# Patient Record
Sex: MALE | Race: WHITE | Employment: OTHER | URBAN - METROPOLITAN AREA
[De-identification: names, ages, dates, MRNs, and addresses within clinical notes are randomized per-mention and may not be internally consistent; named-entity substitution may affect disease eponyms.]

---

## 2017-01-16 ENCOUNTER — HOSPITAL ENCOUNTER (OUTPATIENT)
Dept: NON INVASIVE DIAGNOSTICS | Age: 72
Discharge: HOME | End: 2017-01-16
Attending: INTERNAL MEDICINE
Payer: MEDICARE

## 2017-01-16 DIAGNOSIS — I25.10 ATHEROSCLEROSIS OF NATIVE CORONARY ARTERY OF NATIVE HEART WITHOUT ANGINA PECTORIS: ICD-10-CM

## 2017-01-16 LAB
ATTENDING PHYSICIAN, CST07: NORMAL
DIAGNOSIS, 93000: NORMAL
DUKE TM SCORE RESULT, CST14: NORMAL
DUKE TREADMILL SCORE, CST13: NORMAL
ECG INTERP BEFORE EX, CST11: NORMAL
ECG INTERP DURING EX, CST12: NORMAL
FUNCTIONAL CAPACITY, CST17: NORMAL
KNOWN CARDIAC CONDITION, CST08: NORMAL
MAX. DIASTOLIC BP, CST04: 60 MMHG
MAX. HEART RATE, CST05: 99 BPM
MAX. SYSTOLIC BP, CST03: 130 MMHG
OVERALL BP RESPONSE TO EXERCISE, CST16: NORMAL
OVERALL HR RESPONSE TO EXERCISE, CST15: NORMAL
PEAK EX METS, CST10: 1.5 METS
PROTOCOL NAME, CST01: NORMAL
TEST INDICATION, CST09: NORMAL

## 2017-01-16 PROCEDURE — 93017 CV STRESS TEST TRACING ONLY: CPT | Performed by: INTERNAL MEDICINE

## 2017-01-16 PROCEDURE — A9500 TC99M SESTAMIBI: HCPCS

## 2017-01-16 PROCEDURE — 74011000258 HC RX REV CODE- 258: Performed by: INTERNAL MEDICINE

## 2017-01-16 PROCEDURE — 78452 HT MUSCLE IMAGE SPECT MULT: CPT | Performed by: INTERNAL MEDICINE

## 2017-01-16 PROCEDURE — 74011250636 HC RX REV CODE- 250/636: Performed by: INTERNAL MEDICINE

## 2017-01-16 RX ORDER — SODIUM CHLORIDE 9 MG/ML
100 INJECTION, SOLUTION INTRAVENOUS ONCE
Status: COMPLETED | OUTPATIENT
Start: 2017-01-16 | End: 2017-01-16

## 2017-01-16 RX ADMIN — SODIUM CHLORIDE 100 ML/HR: 900 INJECTION, SOLUTION INTRAVENOUS at 10:45

## 2017-01-16 RX ADMIN — REGADENOSON 0.4 MG: 0.08 INJECTION, SOLUTION INTRAVENOUS at 10:45

## 2017-01-16 NOTE — PROGRESS NOTES
Patient was given 11.0 milliCuries of 99mTc-Sestamibi for the resting images. Patient was also given 33.0 milliCuries of 99mTc-Sestamibi for the stress images. Injected 0.4mg Lexiscan while slowly walking on treadmill. Patient's armband was discarded and shredded.

## 2017-01-17 NOTE — PROCEDURES
[address] STRESS    Name:  Edilma He  MR#:  [id number]  :  1945  Account #:  [de-identified]  Date of Adm:  2017  Date of Service:  2107      PROCEDURE: Lexiscan Cardiolite myocardial perfusion study. Patient of Dr. Prerna Guzman and Dr. Tamara Davies. REASON FOR STUDY: Chronic coronary artery disease. Electrocardiogram resting demonstrates normal sinus rhythm, rate 73  with some sinus arrhythmia; there is an incomplete right bundle branch  block; and some poor R-wave progression anterolaterally with left  ventricular hypertrophy by only voltage criteria. PROCEDURE: The patient received 11 mCi of technetium-99m  sestamibi intravenously via the left arm IV, had a resting myocardial  perfusion study completed. He subsequently was given Lexiscan 0.4  mg intravenously and asked to walk at 0.8 miles per hour at 0 grade for  3 minutes. The patient had no complaints with Lexiscan administration  or significant electrocardiographic changes. He subsequently was  given 33 mCi of technetium-99m sestamibi intravenously via the left  arm IV and had a stress myocardial perfusion study completed and  compared to the resting study. FINDINGS: There appeared to be mild fixed perfusion defect in basal  inferior wall with adequate perfusion throughout the remainder of the  myocardium without clear-cut signs of ischemia or infarction. Gated  SPECT imaging demonstrated normal left ventricular volumes, wall  motion and function with an ejection fraction of 65%. There was a  transient ischemic dilatation index of 1.05, but this is still within normal  limits for a pharmacologic myocardial perfusion study. IMPRESSION  1. Normal Lexiscan Cardiolite myocardial perfusion study.   2. Small area of fixed perfusion defect in the basal inferior wall from   diaphragmatic attenuation presumably with otherwise adequate   perfusion throughout the left ventricular myocardium without signs of   ischemia or infarction. 3. Normal left ventricular volumes, wall motion and function with an  ejection fraction of 65%. 4. Negative pharmacologic stress test electrocardiographically. 5. In comparison to the report of the study of 01/28/2015, there did not  appear to be any significant interval change with that study also  considered to be within normal limits without signs of ischemia and  ejection fraction of 70%. 6. This was a low risk Lexiscan Cardiolite myocardial perfusion study.         DO VICKY Cazares MP  D:  01/16/2017   18:02  T:  01/17/2017   02:22  Job #:  [id number]

## 2017-01-19 ENCOUNTER — OFFICE VISIT (OUTPATIENT)
Dept: CARDIOLOGY CLINIC | Age: 72
End: 2017-01-19

## 2017-01-19 VITALS
DIASTOLIC BLOOD PRESSURE: 62 MMHG | HEIGHT: 66 IN | OXYGEN SATURATION: 96 % | SYSTOLIC BLOOD PRESSURE: 120 MMHG | WEIGHT: 161 LBS | BODY MASS INDEX: 25.88 KG/M2 | HEART RATE: 85 BPM

## 2017-01-19 DIAGNOSIS — E78.00 HYPERCHOLESTEROLEMIA: ICD-10-CM

## 2017-01-19 DIAGNOSIS — I49.3 PVC'S (PREMATURE VENTRICULAR CONTRACTIONS): ICD-10-CM

## 2017-01-19 DIAGNOSIS — I25.10 ATHEROSCLEROSIS OF NATIVE CORONARY ARTERY OF NATIVE HEART WITHOUT ANGINA PECTORIS: Primary | ICD-10-CM

## 2017-01-19 DIAGNOSIS — J43.9 PULMONARY EMPHYSEMA, UNSPECIFIED EMPHYSEMA TYPE (HCC): ICD-10-CM

## 2017-01-19 DIAGNOSIS — R06.09 DOE (DYSPNEA ON EXERTION): ICD-10-CM

## 2017-01-19 NOTE — MR AVS SNAPSHOT
Visit Information Date & Time Provider Department Dept. Phone Encounter #  
 1/19/2017  2:00 PM Jeannette Dubon MD [facility] [address] [id number] Upcoming Health Maintenance Date Due DTaP/Tdap/Td series (1 - Tdap) 1/26/1964 FOBT Q 1 YEAR AGE 50-75 1/26/1993 ZOSTER VACCINE AGE 60> 1/26/2003 GLAUCOMA SCREENING Q2Y 1/26/2008 Pneumococcal 65+ Low/Medium Risk (1 of 2 - PCV13) 1/26/2008 MEDICARE YEARLY EXAM 1/26/2008 INFLUENZA AGE 9 TO ADULT 8/1/2016 Allergies as of 1/19/2017  Review Complete On: 1/19/2017 By: Jeannette Dubon MD  
  
 Severity Noted Reaction Type Reactions Lipitor [Atorvastatin]  03/06/2012    Other (comments) Joint pain Current Immunizations  Never Reviewed No immunizations on file. Not reviewed this visit You Were Diagnosed With   
  
 Codes Comments Atherosclerosis of native coronary artery of native heart without angina pectoris    -  Primary ICD-10-CM: I25.10 ICD-9-CM: 414.01 Vitals BP Pulse Height(growth percentile) Weight(growth percentile) SpO2 BMI  
 120/62 85 5' 6\" (1.676 m) 161 lb (73 kg) 96% 25.99 kg/m2 Smoking Status Never Smoker Vitals History BMI and BSA Data Body Mass Index Body Surface Area  
 25.99 kg/m 2 1.84 m 2 Preferred Pharmacy Pharmacy Name Phone [facility] [phone] [facility] [phone] Your Updated Medication List  
  
   
This list is accurate as of: 1/19/17  3:01 PM.  Always use your most recent med list.  
  
  
  
  
 AMBIEN 10 mg tablet Generic drug:  zolpidem Take 12.5 mg by mouth nightly. clopidogrel 75 mg Tab Commonly known as:  PLAVIX TAKE ONE TABLET BY MOUTH ONCE DAILY  
  
 CO Q-10 10 mg Cap Generic drug:  coenzyme q10 Take 100 mg by mouth daily. levothyroxine 50 mcg tablet Commonly known as:  SYNTHROID  
 Take 50 mcg by mouth daily. LORazepam 1 mg tablet Commonly known as:  ATIVAN Take  by mouth every six (6) hours as needed. metoprolol tartrate 25 mg tablet Commonly known as:  LOPRESSOR  
TAKE ONE TABLET BY MOUTH TWICE DAILY  
  
 nitroglycerin 0.4 mg SL tablet Commonly known as:  NITROSTAT  
1 Tab by SubLINGual route every five (5) minutes as needed for Chest Pain. ondansetron hcl 4 mg tablet Commonly known as:  Sarah Scriver Take 1 tablet by mouth every eight (8) hours as needed for Nausea. PriLOSEC OTC 20 mg tablet Generic drug:  omeprazole Take 20 mg by mouth as needed. VENTOLIN HFA 90 mcg/actuation inhaler Generic drug:  albuterol INHALE TWO PUFFS TWICE DAILY  
  
 VITAMIN B-12 1,000 mcg tablet Generic drug:  cyanocobalamin Take 1,000 mcg by mouth daily. ZOCOR 80 mg tablet Generic drug:  simvastatin Take 80 mg by mouth nightly. ZyrTEC 10 mg tablet Generic drug:  cetirizine Take  by mouth as needed. We Performed the Following AMB POC EKG ROUTINE W/ 12 LEADS, INTER & REP [57357 CPT(R)] Introducing [facility] SERVICES! Carolina Meier introduces [employer] patient portal. Now you can access parts of your medical record, email your doctor's office, and request medication refills online. 1. In your internet browser, go to [email or web address] 2. Click on the First Time User? Click Here link in the Sign In box. You will see the New Member Sign Up page. 3. Enter your [employer] Access Code exactly as it appears below. You will not need to use this code after youve completed the sign-up process. If you do not sign up before the expiration date, you must request a new code. · [employer] Access Code: [email or web address] Expires: 4/11/2017  9:02 AM 
 
4. Enter the last four digits of your Social Security Number (xxxx) and Date of Birth (mm/dd/yyyy) as indicated and click Submit. You will be taken to the next sign-up page. 5. Create a [employer] ID. This will be your [employer] login ID and cannot be changed, so think of one that is secure and easy to remember. 6. Create a [employer] password. You can change your password at any time. 7. Enter your Password Reset Question and Answer. This can be used at a later time if you forget your password. 8. Enter your e-mail address. You will receive e-mail notification when new information is available in [address] 9. Click Sign Up. You can now view and download portions of your medical record. 10. Click the Download Summary menu link to download a portable copy of your medical information. If you have questions, please visit the Frequently Asked Questions section of the [employer] website. Remember, [employer] is NOT to be used for urgent needs. For medical emergencies, dial 911. Now available from your iPhone and Android! Please provide this summary of care documentation to your next provider. Your primary care clinician is listed as [facility]. If you have any questions after today's visit, please call [phone].

## 2017-01-19 NOTE — PROGRESS NOTES
1. Have you been to the ER, urgent care clinic since your last visit? Hospitalized since your last visit? NO  2. Have you seen or consulted any other health care providers outside of the [address] [place] [address] since your last visit? Include any pap smears or colon screening.  NO

## 2017-01-19 NOTE — PROGRESS NOTES
HISTORY OF PRESENT ILLNESS  Zen Vallejo is a 66 y.o. male. HPI  He has been feeling well. Other than chronic dyspnea on exertion, he has no complaints. He continues to play golf regularly with no difficulties. He denies chest pain, resting dyspnea, orthopnea or PND. He has had no palpitations, dizziness or syncope. He has had no symptoms to indicate TIA or amaurosis fugax. He underwent followup stress nuclear cardiac imaging on 01/16/2017, which demonstrated a small area of fixed perfusion defect in the basal inferior wall most likely related to diaphragmatic attenuation with no other perfusion defect. Ejection fraction was in the range of 65%. He has a history of CABG X4 in the latter part of June 1996, which consisted of:    1. Single SVG to the distal LAD. 2. Sequential SVG to the proximal LAD and intermediate marginal branch. 3. Single SVG to the distal RCA. Because of an abnormal thallium myocardial scanning, he underwent a cardiac catheterization on December 4, 2001, which demonstrated total occlusion of the vein graft to the distal LAD but patent sequential SVG to the proximal LAD and intermediate marginal branch. The SVG to the distal RCA was totally occluded; however, the RCA was still open with a 90% stenosis. It was successfully angioplastied and stented. His LV function was mildly diminished with an EF in the range of 50% or less.    Because of the palpitations and skipping, he underwent a 24-hour Holter monitor on February 18, 2004, which demonstrated some frequent atrial ectopies and intermittent short runs of atrial tachycardia and fibrillation and rare PVCs. He was advised to take a beta blocker, but he has been very reluctant to take a beta blocker because of fear of sexual dysfunction. He has had no sustained tachycardia thus far.  Because of recurrent chest pain, he underwent cardiac catheterization on March 7, 2005, which demonstrated:    1. 60% stenosis of the left main trunk.    2. Total occlusion of the LAD in the proximal segment.    3. Severe, diffuse disease of the circumflex artery, with total occlusion.    4. 80% stenosis of the ostium of the ramus intermedius.    5. Patent dominant right coronary artery, with 10% stenosis.    6. Patent SVG to the proximal LAD, but total occlusion of the vein graft to the distal LAD.    7. Total occlusion of the vein graft to the distal RCA.    8. Normal left ventricular systolic function, with EF in the 60% range.    It was felt that he could be best treated medically. He underwent stress nuclear cardiac imaging on May 7, 2007, at which time he was able to exercise 9-minutes, with no chest pain or EKG changes. His nuclear imaging demonstrated mild scarring and ischemia in the inferior wall. The ejection fraction was estimated in the 72% range. He had repeat stress nuclear cardiac imaging on August 20, 2009 at which time he was able to exercise 9 minutes and 35 seconds with no chest pain or EKG changes. Perfusion imaging was essentially unchanged in comparison to the one from May 7, 2007.    He had repeat stress test as a Lexiscan Cardiolite myocardial perfusion imaging on 8/2/11 which was essentially unchanged and negative.    His follow up stress nuclear cardiac imaging on 7/3/2012 demonstrated normal perfusion with no evidence of scaring or ischemia. The ejection fraction was estimated in the 70% range.    Because of the continued dyspnea on exertion, he had repeat cardiac catheterization on 12/6/12 to rule out any atypical presentation of angina. His coronary angiogram demonstrated new 60-85% diffuse narrowing in the native LAD at the insertion of the vein graft to the proximal LAD. The remainder of the coronary anatomy was unchanged and there was no evidence of restenosis of the stented right coronary artery. LVEDP was up to 18 mmHg.  The left ventricular ejection fraction was in the 60% range.    The native LAD was subsequently stented with the 2.25 x 30 mm Resolute stent successfully.    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 His CT scan of the chest demonstrated evidence of COPD with emphysema in the upper lobes and mosaic attenuation which is widespread and likely secondary to small airway disease. There was also some bronchial thickening and mild cylindrical bronchiectasis. He was subsequently referred to a pulmonologist who advised him that he has a chronic bronchitis. He was started on inhalers with very little help.    He underwent stress nuclear cardiac imaging for follow up on 1/28/15, which demonstrated normal perfusion with no evidence of scarring or ischemia. The ejection fraction was in the 70% range. Review of Systems   Constitutional: Negative for malaise/fatigue and weight loss. HENT: Negative for hearing loss. Eyes: Negative for blurred vision and double vision. Respiratory: Positive for shortness of breath. Cardiovascular: Negative for chest pain, palpitations, orthopnea, claudication, leg swelling and PND. Gastrointestinal: Negative for blood in stool, heartburn and melena. Genitourinary: Positive for frequency. Negative for dysuria, hematuria and urgency. Musculoskeletal: Negative for back pain and joint pain. Skin: Negative for itching and rash. Neurological: Positive for dizziness. Negative for loss of consciousness, weakness and headaches. Psychiatric/Behavioral: Negative for depression and memory loss. Physical Exam   Constitutional: He is oriented to person, place, and time. He appears well-developed and well-nourished. HENT:   Head: Normocephalic and atraumatic. Eyes: Conjunctivae are normal. Pupils are equal, round, and reactive to light. Neck: Normal range of motion. Neck supple. No JVD present.    Cardiovascular: Normal rate, regular rhythm, S1 normal and S2 normal.   No extrasystoles are present. PMI is not displaced. Exam reveals no gallop and no friction rub. No murmur heard. Pulses:       Carotid pulses are 3+ on the right side, and 3+ on the left side. Pulmonary/Chest: Effort normal. He has wheezes. He has rales. Abdominal: Soft. There is no tenderness. Musculoskeletal: He exhibits no edema. Neurological: He is alert and oriented to person, place, and time. No cranial nerve deficit. Skin: Skin is warm and dry. Psychiatric: He has a normal mood and affect. His behavior is normal.     Visit Vitals    /62    Pulse 85    Ht 5' 6\" (1.676 m)    Wt 73 kg (161 lb)    SpO2 96%    BMI 25.99 kg/m2       Past Medical History   Diagnosis Date    Arrhythmia     Bronchitis 6/2013         CABG x 4 2001    CAD (coronary artery disease)     Cardiac cath 12/06/2012     RCA prev stent patent. LM severe but patent (supplies pLAD & CX). dLAD 60-85% (2.25 x 30-mm Resolute stent, resid 0%). SVG to % (known). SVG to dLAD 100% (known). Seq SVG to pLAD & RI patent. LVEDP 18.  EF 60%. Poss mild mid anterior hypk.  Cardiac nuclear imaging test, low risk 01/16/2017     Low risk. Sm basal inferior fixed defect, likely artifact. No ischemia. No WMA. EF 65%. Neg EKG on pharm stress test.  Unchanged from study of 1/28/15.     Chest pain      atypical    Chronic obstructive pulmonary disease (HCC)     Coronary artery disease      Status post CABG x 4, with stenting of the native RCA in Dec 2001/ EF 60%    HAYWARD (dyspnea on exertion)     Elevated liver enzymes      mild    Hypercholesterolemia     Hypertension     Palpitations     PVC's (premature ventricular contractions)     Unspecified sleep apnea      wife stated pt's doctor aware  is unable to jazmin use of cpap    Vertigo      mild       Social History     Social History    Marital status:      Spouse name: N/A    Number of children: N/A    Years of education: N/A     Occupational History    Not on file. Social History Main Topics    Smoking status: Never Smoker    Smokeless tobacco: Never Used    Alcohol use Yes      Comment: 10 drinks per week    Drug use: No    Sexual activity: Not on file     Other Topics Concern    Not on file     Social History Narrative       Family History   Problem Relation Age of Onset    Hypertension Brother     Arthritis-osteo Brother     Cancer Mother      bone and breast cancer       Past Surgical History   Procedure Laterality Date    Hx angioplasty  12/2001, 2012     with stenting of native RCA    Pr hand/finger surgery unlisted  11-16-11     right    Hx coronary artery bypass graft  6/1996     x 4; Single SVG to distal LAD; sequential SVG to proximal LAD & intermediate marginal branch; single SVG to distal RCA    Hx cholecystectomy  2010    Hx hernia repair  2007    Hx heent       CATARACT       Current Outpatient Prescriptions   Medication Sig Dispense Refill    clopidogrel (PLAVIX) 75 mg tablet TAKE ONE TABLET BY MOUTH ONCE DAILY 30 Tab 11    metoprolol (LOPRESSOR) 25 mg tablet TAKE ONE TABLET BY MOUTH TWICE DAILY 60 Tab 11    levothyroxine (SYNTHROID) 50 mcg tablet Take 50 mcg by mouth daily.  LORazepam (ATIVAN) 1 mg tablet Take  by mouth every six (6) hours as needed.  ondansetron hcl (ZOFRAN) 4 mg tablet Take 1 tablet by mouth every eight (8) hours as needed for Nausea. 14 tablet 0    VENTOLIN HFA 90 mcg/actuation inhaler INHALE TWO PUFFS TWICE DAILY 17 g 2    simvastatin (ZOCOR) 80 mg tablet Take 80 mg by mouth nightly.  nitroglycerin (NITROSTAT) 0.4 mg SL tablet 1 Tab by SubLINGual route every five (5) minutes as needed for Chest Pain. 25 Tab 3    omeprazole (PRILOSEC OTC) 20 mg tablet Take 20 mg by mouth as needed.  cetirizine (ZYRTEC) 10 mg tablet Take  by mouth as needed.  zolpidem (AMBIEN) 10 mg tablet Take 12.5 mg by mouth nightly.       cyanocobalamin (VITAMIN B-12) 1,000 mcg tablet Take 1,000 mcg by mouth daily.  coenzyme q10 (CO Q-10) 10 mg Cap Take 100 mg by mouth daily. EKG: unchanged from previous tracings, normal sinus rhythm, RBBB, occasional PVC noted, unifocal, left axis deviation, upper limit ME  .  ASSESSMENT and PLAN  Encounter Diagnoses   Name Primary?  Coronary artery disease, status post CABG X4 in June 1996, with stenting of the native RCA in December 2001/ EF 60%; s/p stenting in the LAD 12/6/12 Yes    Chronic PVCs.  HAYWARD (dyspnea on exertion)     Pulmonary emphysema, unspecified emphysema type (HCC)     Hypercholesterolemia    He has been doing reasonably well. He has had no symptoms to indicate angina. He had recent negative stress nuclear cardiac imaging. His dyspnea on exertion seems to be related to his COPD and pulmonary fibrosis. He has remained relatively active playing golf regularly. For now, from a cardiac standpoint, he will be continued on his current medical regimen.

## 2017-01-24 ENCOUNTER — HOSPITAL ENCOUNTER (OUTPATIENT)
Dept: GENERAL RADIOLOGY | Age: 72
Discharge: HOME | End: 2017-01-24
Payer: MEDICARE

## 2017-01-24 DIAGNOSIS — R06.02 SHORTNESS OF BREATH: ICD-10-CM

## 2017-01-24 PROCEDURE — 71020 XR CHEST PA LAT: CPT

## 2017-02-14 RX ORDER — METOPROLOL TARTRATE 25 MG/1
TABLET, FILM COATED ORAL
Qty: 60 TAB | Refills: 6 | Status: SHIPPED | OUTPATIENT
Start: 2017-02-14 | End: 2017-12-05 | Stop reason: SDUPTHER

## 2017-02-23 ENCOUNTER — HOSPITAL ENCOUNTER (OUTPATIENT)
Dept: GENERAL RADIOLOGY | Age: 72
Discharge: HOME | End: 2017-02-23
Payer: MEDICARE

## 2017-02-23 DIAGNOSIS — R06.02 SHORTNESS OF BREATH: ICD-10-CM

## 2017-02-23 PROCEDURE — 71020 XR CHEST PA LAT: CPT

## 2017-07-25 ENCOUNTER — OFFICE VISIT (OUTPATIENT)
Dept: CARDIOLOGY CLINIC | Age: 72
End: 2017-07-25

## 2017-07-25 VITALS
HEIGHT: 66 IN | BODY MASS INDEX: 25.07 KG/M2 | OXYGEN SATURATION: 97 % | DIASTOLIC BLOOD PRESSURE: 70 MMHG | WEIGHT: 156 LBS | HEART RATE: 71 BPM | SYSTOLIC BLOOD PRESSURE: 120 MMHG

## 2017-07-25 DIAGNOSIS — J84.10 PULMONARY FIBROSIS (HCC): ICD-10-CM

## 2017-07-25 DIAGNOSIS — R06.09 DOE (DYSPNEA ON EXERTION): ICD-10-CM

## 2017-07-25 DIAGNOSIS — I25.10 ATHEROSCLEROSIS OF NATIVE CORONARY ARTERY OF NATIVE HEART WITHOUT ANGINA PECTORIS: Primary | ICD-10-CM

## 2017-07-25 DIAGNOSIS — I49.3 PVC'S (PREMATURE VENTRICULAR CONTRACTIONS): ICD-10-CM

## 2017-07-25 NOTE — MR AVS SNAPSHOT
Visit Information Date & Time Provider Department Dept. Phone Encounter #  
 7/25/2017  9:00 AM Kelly Abbott MD [facility] [address] [id number] Upcoming Health Maintenance Date Due DTaP/Tdap/Td series (1 - Tdap) 1/26/1964 FOBT Q 1 YEAR AGE 50-75 1/26/1993 ZOSTER VACCINE AGE 60> 11/26/2002 GLAUCOMA SCREENING Q2Y 1/26/2008 Pneumococcal 65+ Low/Medium Risk (1 of 2 - PCV13) 1/26/2008 MEDICARE YEARLY EXAM 1/26/2008 INFLUENZA AGE 9 TO ADULT 8/1/2017 Allergies as of 7/25/2017  Review Complete On: 7/25/2017 By: Kelly Abbott MD  
  
 Severity Noted Reaction Type Reactions Lipitor [Atorvastatin]  03/06/2012    Other (comments) Joint pain Current Immunizations  Never Reviewed No immunizations on file. Not reviewed this visit You Were Diagnosed With   
  
 Codes Comments Atherosclerosis of native coronary artery of native heart without angina pectoris    -  Primary ICD-10-CM: I25.10 ICD-9-CM: 414.01   
 PVC's (premature ventricular contractions)     ICD-10-CM: I49.3 ICD-9-CM: 427.69   
 HAYWARD (dyspnea on exertion)     ICD-10-CM: R06.09 
ICD-9-CM: 786.09 Vitals BP Pulse Height(growth percentile) Weight(growth percentile) SpO2 BMI  
 120/70 (BP 1 Location: Left arm, BP Patient Position: Sitting) 71 5' 6\" (1.676 m) 156 lb (70.8 kg) 97% 25.18 kg/m2 Smoking Status Never Smoker Vitals History BMI and BSA Data Body Mass Index Body Surface Area  
 25.18 kg/m 2 1.82 m 2 Preferred Pharmacy Pharmacy Name Phone [facility] [phone] [phone] Your Updated Medication List  
  
   
This list is accurate as of: 7/25/17  9:51 AM.  Always use your most recent med list.  
  
  
  
  
 AMBIEN 10 mg tablet Generic drug:  zolpidem Take 12.5 mg by mouth nightly. clopidogrel 75 mg Tab Commonly known as:  PLAVIX TAKE ONE TABLET BY MOUTH ONCE DAILY  
  
 CO Q-10 10 mg Cap Generic drug:  coenzyme q10 Take 100 mg by mouth daily. levothyroxine 50 mcg tablet Commonly known as:  SYNTHROID Take 50 mcg by mouth daily. LORazepam 1 mg tablet Commonly known as:  ATIVAN Take  by mouth every six (6) hours as needed. metoprolol tartrate 25 mg tablet Commonly known as:  LOPRESSOR  
TAKE ONE TABLET BY MOUTH TWICE DAILY  
  
 nitroglycerin 0.4 mg SL tablet Commonly known as:  NITROSTAT  
1 Tab by SubLINGual route every five (5) minutes as needed for Chest Pain. ondansetron hcl 4 mg tablet Commonly known as:  Euell Diones Take 1 tablet by mouth every eight (8) hours as needed for Nausea. PriLOSEC OTC 20 mg tablet Generic drug:  omeprazole Take 20 mg by mouth as needed. VENTOLIN HFA 90 mcg/actuation inhaler Generic drug:  albuterol INHALE TWO PUFFS TWICE DAILY  
  
 VITAMIN B-12 1,000 mcg tablet Generic drug:  cyanocobalamin Take 1,000 mcg by mouth daily. ZOCOR 80 mg tablet Generic drug:  simvastatin Take 80 mg by mouth nightly. ZyrTEC 10 mg tablet Generic drug:  cetirizine Take  by mouth as needed. We Performed the Following AMB POC EKG ROUTINE W/ 12 LEADS, INTER & REP [47643 CPT(R)] Introducing [facility] SERVICES! [facility] introduces [employer] patient portal. Now you can access parts of your medical record, email your doctor's office, and request medication refills online. 1. In your internet browser, go to [email or web address] 2. Click on the First Time User? Click Here link in the Sign In box. You will see the New Member Sign Up page. 3. Enter your [employer] Access Code exactly as it appears below. You will not need to use this code after youve completed the sign-up process. If you do not sign up before the expiration date, you must request a new code. · [employer] Access Code: [email or web address] Expires: 10/23/2017  8:54 AM 
 
4. Enter the last four digits of your Social Security Number (xxxx) and Date of Birth (mm/dd/yyyy) as indicated and click Submit. You will be taken to the next sign-up page. 5. Create a [employer] ID. This will be your [employer] login ID and cannot be changed, so think of one that is secure and easy to remember. 6. Create a [employer] password. You can change your password at any time. 7. Enter your Password Reset Question and Answer. This can be used at a later time if you forget your password. 8. Enter your e-mail address. You will receive e-mail notification when new information is available in [address] 9. Click Sign Up. You can now view and download portions of your medical record. 10. Click the Download Summary menu link to download a portable copy of your medical information. If you have questions, please visit the Frequently Asked Questions section of the [employer] website. Remember, [employer] is NOT to be used for urgent needs. For medical emergencies, dial 911. Now available from your iPhone and Android! Please provide this summary of care documentation to your next provider. Your primary care clinician is listed as [facility]. If you have any questions after today's visit, please call [phone].

## 2017-07-25 NOTE — PROGRESS NOTES
HISTORY OF PRESENT ILLNESS  April Lynn is a 74 y.o. male. HPI  He has been feeling quite well. He continues with dyspnea on exertion, which has not changed much. He denies chest pain, resting dyspnea, orthopnea or PND. He has had no palpitations or syncope. He continues with the chronic dizziness that he has had for over twenty years. There has been no worsening. He has had no symptoms to indicate TIA or amaurosis fugax. He has a history of CABG X4 in the latter part of June 1996, which consisted of:    1. Single SVG to the distal LAD. 2. Sequential SVG to the proximal LAD and intermediate marginal branch. 3. Single SVG to the distal RCA. Because of an abnormal thallium myocardial scanning, he underwent a cardiac catheterization on December 4, 2001, which demonstrated total occlusion of the vein graft to the distal LAD but patent sequential SVG to the proximal LAD and intermediate marginal branch. The SVG to the distal RCA was totally occluded; however, the RCA was still open with a 90% stenosis. It was successfully angioplastied and stented. His LV function was mildly diminished with an EF in the range of 50% or less.    Because of the palpitations and skipping, he underwent a 24-hour Holter monitor on February 18, 2004, which demonstrated some frequent atrial ectopies and intermittent short runs of atrial tachycardia and fibrillation and rare PVCs. He was advised to take a beta blocker, but he has been very reluctant to take a beta blocker because of fear of sexual dysfunction. He has had no sustained tachycardia thus far. Because of recurrent chest pain, he underwent cardiac catheterization on March 7, 2005, which demonstrated:    1. 60% stenosis of the left main trunk.    2. Total occlusion of the LAD in the proximal segment.    3. Severe, diffuse disease of the circumflex artery, with total occlusion.    4. 80% stenosis of the ostium of the ramus intermedius.    5.  Patent dominant right coronary artery, with 10% stenosis.    6. Patent SVG to the proximal LAD, but total occlusion of the vein graft to the distal LAD.    7. Total occlusion of the vein graft to the distal RCA.    8. Normal left ventricular systolic function, with EF in the 60% range.    It was felt that he could be best treated medically. He underwent stress nuclear cardiac imaging on May 7, 2007, at which time he was able to exercise 9-minutes, with no chest pain or EKG changes. His nuclear imaging demonstrated mild scarring and ischemia in the inferior wall. The ejection fraction was estimated in the 72% range. He had repeat stress nuclear cardiac imaging on August 20, 2009 at which time he was able to exercise 9 minutes and 35 seconds with no chest pain or EKG changes. Perfusion imaging was essentially unchanged in comparison to the one from May 7, 2007.    He had repeat stress test as a Lexiscan Cardiolite myocardial perfusion imaging on 8/2/11 which was essentially unchanged and negative.    His follow up stress nuclear cardiac imaging on 7/3/2012 demonstrated normal perfusion with no evidence of scaring or ischemia. The ejection fraction was estimated in the 70% range.    Because of the continued dyspnea on exertion, he had repeat cardiac catheterization on 12/6/12 to rule out any atypical presentation of angina. His coronary angiogram demonstrated new 60-85% diffuse narrowing in the native LAD at the insertion of the vein graft to the proximal LAD. The remainder of the coronary anatomy was unchanged and there was no evidence of restenosis of the stented right coronary artery. LVEDP was up to 18 mmHg. The left ventricular ejection fraction was in the 60% range.    The native LAD was subsequently stented with the 2.25 x 30 mm Resolute stent successfully.    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  His CT scan of the chest demonstrated evidence of COPD with emphysema in the upper lobes and mosaic attenuation which is widespread and likely secondary to small airway disease. There was also some bronchial thickening and mild cylindrical bronchiectasis. He was subsequently referred to a pulmonologist who advised him that he has a chronic bronchitis. He was started on inhalers with very little help.    He underwent stress nuclear cardiac imaging for follow up on 1/28/15, which demonstrated normal perfusion with no evidence of scarring or ischemia. The ejection fraction was in the 70% range. He underwent followup stress nuclear cardiac imaging on 01/16/2017, which demonstrated a small area of fixed perfusion defect in the basal inferior wall most likely related to diaphragmatic attenuation with no other perfusion defect. Ejection fraction was in the range of 65%. Review of Systems   Constitutional: Negative for malaise/fatigue and weight loss. HENT: Negative for hearing loss. Eyes: Negative for blurred vision and double vision. Respiratory: Positive for shortness of breath. Cardiovascular: Positive for palpitations. Negative for chest pain, orthopnea, claudication, leg swelling and PND. Gastrointestinal: Negative for blood in stool, heartburn and melena. Genitourinary: Positive for frequency. Negative for dysuria, hematuria and urgency. Musculoskeletal: Negative for back pain and joint pain. Skin: Negative for itching and rash. Neurological: Negative for dizziness, loss of consciousness, weakness and headaches. Psychiatric/Behavioral: Negative for depression and memory loss. Physical Exam   Constitutional: He is oriented to person, place, and time. He appears well-developed and well-nourished. HENT:   Head: Normocephalic and atraumatic. Eyes: Conjunctivae are normal. Pupils are equal, round, and reactive to light. Neck: Normal range of motion. Neck supple. No JVD present.    Cardiovascular: Normal rate, regular rhythm, S1 normal and S2 normal.   No extrasystoles are present. PMI is not displaced. Exam reveals no gallop and no friction rub. No murmur heard. Pulses:       Carotid pulses are 3+ on the right side, and 3+ on the left side. Pulmonary/Chest: Effort normal. He has wheezes. He has rales. Abdominal: Soft. There is no tenderness. Musculoskeletal: He exhibits no edema. Neurological: He is alert and oriented to person, place, and time. No cranial nerve deficit. Skin: Skin is warm and dry. Psychiatric: He has a normal mood and affect. His behavior is normal.     Visit Vitals    /70 (BP 1 Location: Left arm, BP Patient Position: Sitting)    Pulse 71    Ht 5' 6\" (1.676 m)    Wt 70.8 kg (156 lb)    SpO2 97%    BMI 25.18 kg/m2       Past Medical History:   Diagnosis Date    Arrhythmia     Bronchitis 6/2013        CABG x 4 2001    CAD (coronary artery disease)     Cardiac cath 12/06/2012    RCA prev stent patent. LM severe but patent (supplies pLAD & CX). dLAD 60-85% (2.25 x 30-mm Resolute stent, resid 0%). SVG to % (known). SVG to dLAD 100% (known). Seq SVG to pLAD & RI patent. LVEDP 18.  EF 60%. Poss mild mid anterior hypk.  Cardiac nuclear imaging test, low risk 01/16/2017    Low risk. Sm basal inferior fixed defect, likely artifact. No ischemia. No WMA. EF 65%. Neg EKG on pharm stress test.  Unchanged from study of 1/28/15.     Chest pain     atypical    Chronic obstructive pulmonary disease (HCC)     Coronary artery disease     Status post CABG x 4, with stenting of the native RCA in Dec 2001/ EF 60%    HAYWARD (dyspnea on exertion)     Elevated liver enzymes     mild    Hypercholesterolemia     Hypertension     Palpitations     PVC's (premature ventricular contractions)     Unspecified sleep apnea     wife stated pt's doctor aware  is unable to jazmin use of cpap    Vertigo     mild       Social History     Social History    Marital status:      Spouse name: N/A    Number of children: N/A    Years of education: N/A     Occupational History    Not on file. Social History Main Topics    Smoking status: Never Smoker    Smokeless tobacco: Never Used    Alcohol use Yes      Comment: 10 drinks per week    Drug use: No    Sexual activity: Not on file     Other Topics Concern    Not on file     Social History Narrative       Family History   Problem Relation Age of Onset    Hypertension Brother     Arthritis-osteo Brother     Cancer Mother      bone and breast cancer       Past Surgical History:   Procedure Laterality Date    HAND/FINGER SURGERY UNLISTED  11-16-11    right   [place] ANGIOPLASTY  12/2001, 2012    with stenting of native RCA    HX CHOLECYSTECTOMY  2010    HX CORONARY ARTERY BYPASS GRAFT  6/1996    x 4; Single SVG to distal LAD; sequential SVG to proximal LAD & intermediate marginal branch; single SVG to distal RCA    HX HEENT      CATARACT    HX HERNIA REPAIR  2007       Current Outpatient Prescriptions   Medication Sig Dispense Refill    metoprolol tartrate (LOPRESSOR) 25 mg tablet TAKE ONE TABLET BY MOUTH TWICE DAILY 60 Tab 6    clopidogrel (PLAVIX) 75 mg tablet TAKE ONE TABLET BY MOUTH ONCE DAILY 30 Tab 11    levothyroxine (SYNTHROID) 50 mcg tablet Take 50 mcg by mouth daily.  LORazepam (ATIVAN) 1 mg tablet Take  by mouth every six (6) hours as needed.  ondansetron hcl (ZOFRAN) 4 mg tablet Take 1 tablet by mouth every eight (8) hours as needed for Nausea. 14 tablet 0    VENTOLIN HFA 90 mcg/actuation inhaler INHALE TWO PUFFS TWICE DAILY 17 g 2    simvastatin (ZOCOR) 80 mg tablet Take 80 mg by mouth nightly.  nitroglycerin (NITROSTAT) 0.4 mg SL tablet 1 Tab by SubLINGual route every five (5) minutes as needed for Chest Pain. 25 Tab 3    omeprazole (PRILOSEC OTC) 20 mg tablet Take 20 mg by mouth as needed.  cetirizine (ZYRTEC) 10 mg tablet Take  by mouth as needed.  zolpidem (AMBIEN) 10 mg tablet Take 12.5 mg by mouth nightly.  cyanocobalamin (VITAMIN B-12) 1,000 mcg tablet Take 1,000 mcg by mouth daily.  coenzyme q10 (CO Q-10) 10 mg Cap Take 100 mg by mouth daily. EKG: unchanged from previous tracings, normal sinus rhythm, RBBB, left axis deviation. ASSESSMENT and PLAN  Encounter Diagnoses   Name Primary?  Coronary artery disease, status post CABG X4 in June 1996, with stenting of the native RCA in December 2001/ EF 60%; s/p stenting in the LAD 12/6/12 Yes    Chronic PVCs.  HAYWARD (dyspnea on exertion)     Pulmonary fibrosis ([address]    He has been doing quite well from a cardiac standpoint. He has had no symptoms to indicate angina or cardiac decompensation. His blood pressure has been under control. His dyspnea on exertion appears to be predominantly related to pulmonary fibrosis. He does have chronic rales and a few scattered wheezing. He was given advice to start using inhalers for now. Cardiac wise, he will be continued on the current medical regimen.

## 2017-07-25 NOTE — PROGRESS NOTES
1. Have you been to the ER, urgent care clinic since your last visit? Hospitalized since your last visit? no  2. Have you seen or consulted any other health care providers outside of the [address] [place] [address] since your last visit? Include any pap smears or colon screening.   no

## 2017-09-18 RX ORDER — CLOPIDOGREL BISULFATE 75 MG/1
75 TABLET ORAL DAILY
Qty: 90 TAB | Refills: 3 | Status: SHIPPED | OUTPATIENT
Start: 2017-09-18 | End: 2017-12-26 | Stop reason: CLARIF

## 2017-10-06 ENCOUNTER — APPOINTMENT (OUTPATIENT)
Dept: GENERAL RADIOLOGY | Age: 72
End: 2017-10-06
Attending: PHYSICIAN ASSISTANT
Payer: MEDICARE

## 2017-10-06 ENCOUNTER — HOSPITAL ENCOUNTER (EMERGENCY)
Age: 72
Discharge: HOME | End: 2017-10-06
Attending: EMERGENCY MEDICINE
Payer: MEDICARE

## 2017-10-06 VITALS
RESPIRATION RATE: 17 BRPM | DIASTOLIC BLOOD PRESSURE: 69 MMHG | HEIGHT: 66 IN | HEART RATE: 85 BPM | OXYGEN SATURATION: 98 % | SYSTOLIC BLOOD PRESSURE: 142 MMHG | TEMPERATURE: 98 F | WEIGHT: 155 LBS | BODY MASS INDEX: 24.91 KG/M2

## 2017-10-06 DIAGNOSIS — S81.812A LACERATION OF LEFT LOWER EXTREMITY, INITIAL ENCOUNTER: Primary | ICD-10-CM

## 2017-10-06 PROCEDURE — 90471 IMMUNIZATION ADMIN: CPT

## 2017-10-06 PROCEDURE — 90715 TDAP VACCINE 7 YRS/> IM: CPT | Performed by: PHYSICIAN ASSISTANT

## 2017-10-06 PROCEDURE — 99282 EMERGENCY DEPT VISIT SF MDM: CPT

## 2017-10-06 PROCEDURE — 77030031132 HC SUT NYL COVD -A

## 2017-10-06 PROCEDURE — 73590 X-RAY EXAM OF LOWER LEG: CPT

## 2017-10-06 PROCEDURE — 75810000293 HC SIMP/SUPERF WND  RPR

## 2017-10-06 PROCEDURE — 74011250636 HC RX REV CODE- 250/636: Performed by: PHYSICIAN ASSISTANT

## 2017-10-06 PROCEDURE — 77030018836 HC SOL IRR NACL ICUM -A

## 2017-10-06 RX ADMIN — TETANUS TOXOID, REDUCED DIPHTHERIA TOXOID AND ACELLULAR PERTUSSIS VACCINE, ADSORBED 0.5 ML: 5; 2.5; 8; 8; 2.5 SUSPENSION INTRAMUSCULAR at 15:17

## 2017-10-06 NOTE — ED PROVIDER NOTES
HPI Comments: 74 yr old male hx CAD, htn, hypercholesterolemia, and COPD presents to the ED after sustaining a laceration to the L lower leg PTA. Pt reports he was playing golf and had his leg hanging outside the golf cart when he was stuck with a large stick. Pt states he pulled the stick out of the leg and held pressure to the wound. Unknown last tetanus. No other complaints. Past Medical History:   Diagnosis Date    Arrhythmia     Bronchitis 6/2013        CABG x 4 2001    CAD (coronary artery disease)     Cardiac cath 12/06/2012    RCA prev stent patent. LM severe but patent (supplies pLAD & CX). dLAD 60-85% (2.25 x 30-mm Resolute stent, resid 0%). SVG to % (known). SVG to dLAD 100% (known). Seq SVG to pLAD & RI patent. LVEDP 18.  EF 60%. Poss mild mid anterior hypk.  Cardiac nuclear imaging test, low risk 01/16/2017    Low risk. Sm basal inferior fixed defect, likely artifact. No ischemia. No WMA. EF 65%. Neg EKG on pharm stress test.  Unchanged from study of 1/28/15.     Chest pain     atypical    Chronic obstructive pulmonary disease (HCC)     Coronary artery disease     Status post CABG x 4, with stenting of the native RCA in Dec 2001/ EF 60%    HAYWARD (dyspnea on exertion)     Elevated liver enzymes     mild    Hypercholesterolemia     Hypertension     Palpitations     PVC's (premature ventricular contractions)     Unspecified sleep apnea     wife stated pt's doctor aware  is unable to jazmin use of cpap    Vertigo     mild       Past Surgical History:   Procedure Laterality Date    HAND/FINGER SURGERY UNLISTED  11-16-11    right    HX ANGIOPLASTY  12/2001, 2012    with stenting of native RCA    HX CHOLECYSTECTOMY  2010    HX CORONARY ARTERY BYPASS GRAFT  6/1996    x 4; Single SVG to distal LAD; sequential SVG to proximal LAD & intermediate marginal branch; single SVG to distal RCA    HX HEENT      CATARACT    HX HERNIA REPAIR  2007 Family History:   Problem Relation Age of Onset    Hypertension Brother     Arthritis-osteo Brother     Cancer Mother      bone and breast cancer       Social History     Social History    Marital status:      Spouse name: N/A    Number of children: N/A    Years of education: N/A     Occupational History    Not on file. Social History Main Topics    Smoking status: Never Smoker    Smokeless tobacco: Never Used    Alcohol use Yes      Comment: 10 drinks per week    Drug use: No    Sexual activity: Not on file     Other Topics Concern    Not on file     Social History Narrative         ALLERGIES: Lipitor [atorvastatin]    Review of Systems   Constitutional: Negative. Negative for chills, diaphoresis, fatigue and fever. HENT: Negative. Negative for congestion, ear pain, rhinorrhea and sore throat. Eyes: Negative. Negative for pain and redness. Respiratory: Negative. Negative for cough, shortness of breath, wheezing and stridor. Cardiovascular: Negative. Negative for chest pain, palpitations and leg swelling. Gastrointestinal: Negative. Negative for abdominal pain, constipation, diarrhea, nausea and vomiting. Endocrine: Negative. Genitourinary: Negative. Negative for dysuria, flank pain, frequency and hematuria. Musculoskeletal: Negative. Negative for back pain, myalgias, neck pain and neck stiffness. Skin: Positive for wound. Negative for rash. Allergic/Immunologic: Negative. Neurological: Negative. Negative for dizziness, seizures, syncope and headaches. Hematological: Negative. Psychiatric/Behavioral: Negative. All other systems reviewed and are negative. There were no vitals filed for this visit. Physical Exam   Constitutional: He is oriented to person, place, and time. He appears well-developed and well-nourished. No distress. HENT:   Head: Normocephalic. Neck: Normal range of motion. Neck supple.    Cardiovascular: Normal rate, regular rhythm and normal heart sounds. Slightly tachycardiac in triage, normal rate on auscultation    Pulmonary/Chest: Effort normal and breath sounds normal. No stridor. No respiratory distress. He has no wheezes. He has no rales. Musculoskeletal: Normal range of motion. He exhibits no edema, tenderness or deformity. Neurological: He is alert and oriented to person, place, and time. Skin: Skin is warm and dry. He is not diaphoretic.   4 cm laceration into the subcutaneous tissue noted to the medial aspect of the L distal tibia, bleeding controlled, no obvious retained FB seen, ecchymosis noted around the wound. Psychiatric: He has a normal mood and affect. His behavior is normal. Thought content normal.   Nursing note and vitals reviewed. MDM  Number of Diagnoses or Management Options  Laceration of left lower extremity, initial encounter:   Diagnosis management comments: Impression:  Laceration    Tetanus updated    X-ray tib/fib Soft tissue swelling    Patient is stable for discharge at this time. No new rx given. Rest and follow-up with PCP this week. Return to the ED immediately for any new or worsening sx. Monet Villar PA-C 4:11 PM        Amount and/or Complexity of Data Reviewed  Tests in the radiology section of CPT®: reviewed and ordered    Risk of Complications, Morbidity, and/or Mortality  Presenting problems: low  Diagnostic procedures: low  Management options: low    Patient Progress  Patient progress: stable    ED Course       Wound Repair  Date/Time: 10/6/2017 4:09 PM  Performed by: PAPreparation: skin prepped with Betadine and sterile field established  Pre-procedure re-eval: Immediately prior to the procedure, the patient was reevaluated and found suitable for the planned procedure and any planned medications. Time out: Immediately prior to the procedure a time out was called to verify the correct patient, procedure, equipment, staff and marking as appropriate. .  Location details: left leg  Wound length:2.6 - 7.5 cm  Anesthesia: local infiltration    Anesthesia:  Local Anesthetic: lidocaine 1% without epinephrine  Anesthetic total: 4 mL  Foreign bodies: no foreign bodies  Irrigation solution: saline  Irrigation method: syringe  Debridement: none  Skin closure: 4-0 nylon  Number of sutures: 8  Technique: simple  Approximation: close  Patient tolerance: Patient tolerated the procedure well with no immediate complications  My total time at bedside, performing this procedure was 1-15 minutes.

## 2017-10-06 NOTE — ED NOTES
I have reviewed discharge instructions with the patient. The patient verbalized understanding. Patient armband removed and given to patient to take home.   Patient was informed of the privacy risks if armband lost or stolen  Current Discharge Medication List

## 2017-10-06 NOTE — DISCHARGE INSTRUCTIONS
[facility] [facility] [facility] Activation    Thank you for requesting access to [facility] [facility] [facility]. Please follow the instructions below to securely access and download your online medical record. [facility] [facility] [facility] allows you to send messages to your doctor, view your test results, renew your prescriptions, schedule appointments, and more. How Do I Sign Up? 1. In your internet browser, go to [email or web address] in  2. Click on the First Time User? Click Here link in the Sign In box. You will be redirect to the New Member Sign Up page. 3. Enter your [employer] [facility] [facility] Access Code exactly as it appears below. You will not need to use this code after youve completed the sign-up process. If you do not sign up before the expiration date, you must request a new code. [employer] [facility] Access Code: [email or web address]  Expires: 10/23/2017  8:54 AM (This is the date your [facility] [facility] [facility] access code will )    4. Enter the last four digits of your Social Security Number (xxxx) and Date of Birth (mm/dd/yyyy) as indicated and click Submit. You will be taken to the next sign-up page. 5. Create a [facility] [facility] [facility] ID. This will be your [facility] [facility] [facility] login ID and cannot be changed, so think of one that is secure and easy to remember. 6. Create a [facility] [facility] [facility] password. You can change your password at any time. 7. Enter your Password Reset Question and Answer. This can be used at a later time if you forget your password. 8. Enter your e-mail address. You will receive e-mail notification when new information is available in [address]. 9. Click Sign Up. You can now view and download portions of your medical record. 10. Click the Download Summary menu link to download a portable copy of your medical information. Additional Information    If you have questions, please visit the Frequently Asked Questions section of the [employer] website at [email or web address] Remember, [employer] is NOT to be used for urgent needs. For medical emergencies, dial 911.

## 2017-10-06 NOTE — ED TRIAGE NOTES
Pt presents to the ED with laceration to the inner aspect of the lower left leg onset today after sustaining an injury from a tree branch into his left leg while sitting in a moving golf cart. Pt states left leg was hanging out of the golf cart, states \"stick got stuck in my leg. \" Pt reports pulling out the foreign body. Pt with noted moderate bleeding to the left leg.  Pt appears in NOAD

## 2017-12-05 ENCOUNTER — HOSPITAL ENCOUNTER (OUTPATIENT)
Dept: GENERAL RADIOLOGY | Age: 72
Discharge: HOME | End: 2017-12-05
Payer: MEDICARE

## 2017-12-05 ENCOUNTER — HOSPITAL ENCOUNTER (OUTPATIENT)
Dept: LAB | Age: 72
Discharge: HOME | End: 2017-12-05
Payer: MEDICARE

## 2017-12-05 ENCOUNTER — OFFICE VISIT (OUTPATIENT)
Dept: CARDIOLOGY CLINIC | Age: 72
End: 2017-12-05

## 2017-12-05 VITALS
HEIGHT: 66 IN | DIASTOLIC BLOOD PRESSURE: 72 MMHG | SYSTOLIC BLOOD PRESSURE: 112 MMHG | HEART RATE: 120 BPM | BODY MASS INDEX: 24.59 KG/M2 | WEIGHT: 153 LBS | OXYGEN SATURATION: 95 %

## 2017-12-05 DIAGNOSIS — I25.10 ATHEROSCLEROSIS OF NATIVE CORONARY ARTERY OF NATIVE HEART WITHOUT ANGINA PECTORIS: ICD-10-CM

## 2017-12-05 DIAGNOSIS — I48.91 NEW ONSET A-FIB (HCC): ICD-10-CM

## 2017-12-05 DIAGNOSIS — J84.10 PULMONARY FIBROSIS (HCC): ICD-10-CM

## 2017-12-05 DIAGNOSIS — R06.09 DOE (DYSPNEA ON EXERTION): ICD-10-CM

## 2017-12-05 DIAGNOSIS — I48.91 NEW ONSET A-FIB (HCC): Primary | ICD-10-CM

## 2017-12-05 DIAGNOSIS — R09.89 RALES: ICD-10-CM

## 2017-12-05 DIAGNOSIS — G47.33 SLEEP APNEA, OBSTRUCTIVE: ICD-10-CM

## 2017-12-05 LAB
ANION GAP SERPL CALC-SCNC: 10 MMOL/L (ref 3–18)
BNP SERPL-MCNC: 4872 PG/ML (ref 0–900)
BUN SERPL-MCNC: 18 MG/DL (ref 7–18)
BUN/CREAT SERPL: 15 (ref 12–20)
CALCIUM SERPL-MCNC: 9.3 MG/DL (ref 8.5–10.1)
CHLORIDE SERPL-SCNC: 101 MMOL/L (ref 100–108)
CO2 SERPL-SCNC: 25 MMOL/L (ref 21–32)
CREAT SERPL-MCNC: 1.19 MG/DL (ref 0.6–1.3)
GLUCOSE SERPL-MCNC: 155 MG/DL (ref 74–99)
POTASSIUM SERPL-SCNC: 4 MMOL/L (ref 3.5–5.5)
SODIUM SERPL-SCNC: 136 MMOL/L (ref 136–145)

## 2017-12-05 PROCEDURE — 71020 XR CHEST PA LAT: CPT

## 2017-12-05 PROCEDURE — 83880 ASSAY OF NATRIURETIC PEPTIDE: CPT | Performed by: INTERNAL MEDICINE

## 2017-12-05 PROCEDURE — 36415 COLL VENOUS BLD VENIPUNCTURE: CPT | Performed by: INTERNAL MEDICINE

## 2017-12-05 PROCEDURE — 85025 COMPLETE CBC W/AUTO DIFF WBC: CPT | Performed by: INTERNAL MEDICINE

## 2017-12-05 PROCEDURE — 80048 BASIC METABOLIC PNL TOTAL CA: CPT | Performed by: INTERNAL MEDICINE

## 2017-12-05 RX ORDER — LEVOFLOXACIN 750 MG/1
TABLET ORAL
COMMUNITY
Start: 2017-12-04 | End: 2017-12-26

## 2017-12-05 RX ORDER — FLUTICASONE FUROATE AND VILANTEROL TRIFENATATE 200; 25 UG/1; UG/1
1 POWDER RESPIRATORY (INHALATION) DAILY
COMMUNITY
Start: 2017-11-21 | End: 2019-01-01

## 2017-12-05 RX ORDER — METOPROLOL TARTRATE 50 MG/1
50 TABLET ORAL 2 TIMES DAILY
Qty: 60 TAB | Refills: 6 | Status: SHIPPED | OUTPATIENT
Start: 2017-12-05 | End: 2018-04-23 | Stop reason: SDUPTHER

## 2017-12-05 NOTE — MR AVS SNAPSHOT
Visit Information Date & Time Provider Department Dept. Phone Encounter #  
 12/5/2017 11:00 AM Sergio Arias MD [facility] [id number] Your Appointments 1/23/2018  9:00 AM  
Follow Up with Sergio Arias MD  
[facility] [facility] Appt Note: 6 month f/up Consuelo Morales [id number] [phone] [address] [place] [address] [address] Upcoming Health Maintenance Date Due FOBT Q 1 YEAR AGE 50-75 1/26/1993 ZOSTER VACCINE AGE 60> 11/26/2002 GLAUCOMA SCREENING Q2Y 1/26/2008 Pneumococcal 65+ Low/Medium Risk (1 of 2 - PCV13) 1/26/2008 MEDICARE YEARLY EXAM 1/26/2008 Influenza Age 5 to Adult 8/1/2017 DTaP/Tdap/Td series (2 - Td) 10/6/2027 Allergies as of 12/5/2017  Review Complete On: 12/5/2017 By: Sergio Arias MD  
  
 Severity Noted Reaction Type Reactions Lipitor [Atorvastatin]  03/06/2012    Other (comments) Joint pain Current Immunizations  Never Reviewed Name Date Tdap 10/6/2017  3:17 PM  
  
 Not reviewed this visit You Were Diagnosed With   
  
 Codes Comments New onset a-fib [facility])    -  Primary ICD-10-CM: I48.91 
ICD-9-CM: 427.31 Atherosclerosis of native coronary artery of native heart without angina pectoris     ICD-10-CM: I25.10 ICD-9-CM: 414.01 Rales     ICD-10-CM: R09.89 ICD-9-CM: 317. 7 Vitals BP Pulse Height(growth percentile) Weight(growth percentile) SpO2 BMI  
 112/72 (!) 120 5' 6\" (1.676 m) 153 lb (69.4 kg) 95% 24.69 kg/m2 Smoking Status Never Smoker Vitals History BMI and BSA Data Body Mass Index Body Surface Area  
 24.69 kg/m 2 1.8 m 2 Preferred Pharmacy Pharmacy Name Phone [facility], [address] [phone] Your Updated Medication List  
  
   
 This list is accurate as of: 12/5/17 12:42 PM.  Always use your most recent med list.  
  
  
  
  
 AMBIEN 10 mg tablet Generic drug:  zolpidem Take 12.5 mg by mouth nightly. BREO ELLIPTA 200-25 mcg/dose inhaler Generic drug:  fluticasone-vilanterol  
  
 clopidogrel 75 mg Tab Commonly known as:  PLAVIX Take 1 Tab by mouth daily. CO Q-10 10 mg Cap Generic drug:  coenzyme q10 Take 100 mg by mouth daily. levoFLOXacin 750 mg tablet Commonly known as:  LEVAQUIN  
  
 levothyroxine 50 mcg tablet Commonly known as:  SYNTHROID Take 50 mcg by mouth daily. LORazepam 1 mg tablet Commonly known as:  ATIVAN Take  by mouth every six (6) hours as needed. metoprolol tartrate 25 mg tablet Commonly known as:  LOPRESSOR  
TAKE ONE TABLET BY MOUTH TWICE DAILY  
  
 nitroglycerin 0.4 mg SL tablet Commonly known as:  NITROSTAT  
1 Tab by SubLINGual route every five (5) minutes as needed for Chest Pain. ondansetron hcl 4 mg tablet Commonly known as:  Sarah Scriver Take 1 tablet by mouth every eight (8) hours as needed for Nausea. PriLOSEC OTC 20 mg tablet Generic drug:  omeprazole Take 20 mg by mouth as needed. VENTOLIN HFA 90 mcg/actuation inhaler Generic drug:  albuterol INHALE TWO PUFFS TWICE DAILY  
  
 VITAMIN B-12 1,000 mcg tablet Generic drug:  cyanocobalamin Take 1,000 mcg by mouth daily. ZOCOR 80 mg tablet Generic drug:  simvastatin Take 80 mg by mouth nightly. ZyrTEC 10 mg tablet Generic drug:  cetirizine Take  by mouth as needed. We Performed the Following AMB POC EKG ROUTINE W/ 12 LEADS, INTER & REP [03792 CPT(R)] To-Do List   
 12/05/2017 Lab:  CBC WITH AUTOMATED DIFF   
  
 12/05/2017 ECG:  ECG HOLTER MONITOR, UP TO 48 HRS   
  
 12/05/2017 Lab:  METABOLIC PANEL, BASIC   
  
 12/05/2017 Lab:  NT-PRO BNP   
  
 12/05/2017   Imaging:  XR CHEST PA LAT   
  
 12/12/2017 3:15 PM  
 Appointment with [facility] HOLTER TECH at [facility] NON-INVASIVE CARD ([phone]) Age Limit for ALL Heart procedures @ hospitals is 18 yrs and older only. Under the age of 25, refer to [facility] ([phone]). PATIENTS REPORT TO: Markell Pruitt ([employer] [facility]) - 2nd Floor [address]  This study requires a physician's written prescription. Patients may bring the order or it may also be faxed to Central Scheduling at [phone]. Please wear a shirt with buttons down the front. Bring list of medications. Do not have a bath/shower during your 24 hour recording. Please remind patient they will need to return 24 hours, 48 hours, or 72 hours after monitor is in place to have it removed by the technician.  (example: If patient is scheduled for a 24 hour holter, return 24 hours after monitor is in place to have it removed. If patient is scheduled for a 48 hour holter monitor, then they would return 48 hours after holter is in place, etc.)  Staff is NOT available on Saturdays to remove holter monitor. IMPORTANT:  Once patient has been fitted with a HOLTER MONITOR, they should not have any other exams performed until the Holter has been removed. Introducing [facility] SERVICES! Gifty Elizabeth introduces [employer] patient portal. Now you can access parts of your medical record, email your doctor's office, and request medication refills online. 1. In your internet browser, go to [email or web address] 2. Click on the First Time User? Click Here link in the Sign In box. You will see the New Member Sign Up page. 3. Enter your [employer] Access Code exactly as it appears below. You will not need to use this code after youve completed the sign-up process. If you do not sign up before the expiration date, you must request a new code. · [employer] Access Code: [email or web address] Expires: 3/5/2018 12:42 PM 
 
4.  Enter the last four digits of your Social Security Number (xxxx) and Date of Birth (mm/dd/yyyy) as indicated and click Submit. You will be taken to the next sign-up page. 5. Create a "[employer]" ID. This will be your "[employer]" login ID and cannot be changed, so think of one that is secure and easy to remember. 6. Create a "[employer]" password. You can change your password at any time. 7. Enter your Password Reset Question and Answer. This can be used at a later time if you forget your password. 8. Enter your e-mail address. You will receive e-mail notification when new information is available in [address] 9. Click Sign Up. You can now view and download portions of your medical record. 10. Click the Download Summary menu link to download a portable copy of your medical information. If you have questions, please visit the Frequently Asked Questions section of the "[employer]" website. Remember, "[employer]" is NOT to be used for urgent needs. For medical emergencies, dial 911. Now available from your iPhone and Android! Please provide this summary of care documentation to your next provider. Your primary care clinician is listed as [facility]. If you have any questions after today's visit, please call [phone].

## 2017-12-05 NOTE — PROGRESS NOTES
1. Have you been to the ER, urgent care clinic since your last visit? Hospitalized since your last visit? no  2. Have you seen or consulted any other health care providers outside of the [address] [place] [address] since your last visit? Include any pap smears or colon screening.   no

## 2017-12-05 NOTE — PROGRESS NOTES
HISTORY OF PRESENT ILLNESS  Nadia Ayon is a 74 y.o. male. HPI  This consultation was requested urgently by his primary care physician, Dr. Jennifer Carlisle, for the evaluation of new onset atrial fibrillation. He went to see Dr. Chanelle Mathis for flulike symptoms with coughing and shortness of breath. An electrocardiogram demonstrated new onset atrial fibrillation and he was subsequently sent over to my office for further evaluation and treatment. For the past few weeks, he has been experiencing coughing, increasing shortness of breath and fatigue. He has had a mild fever as well. He denies palpitations whatsoever. He denies chest pain, resting dyspnea, orthopnea or PND. He has had no dizziness or syncope. He has had no symptoms to indicate TIA or amaurosis fugax. He has known sleep apnea; however, he has not been able to tolerate the CPAP. He has a history of CABG X4 in the latter part of June 1996, which consisted of:    1. Single SVG to the distal LAD. 2. Sequential SVG to the proximal LAD and intermediate marginal branch. 3. Single SVG to the distal RCA. Because of an abnormal thallium myocardial scanning, he underwent a cardiac catheterization on December 4, 2001, which demonstrated total occlusion of the vein graft to the distal LAD but patent sequential SVG to the proximal LAD and intermediate marginal branch. The SVG to the distal RCA was totally occluded; however, the RCA was still open with a 90% stenosis. It was successfully angioplastied and stented. His LV function was mildly diminished with an EF in the range of 50% or less.    Because of the palpitations and skipping, he underwent a 24-hour Holter monitor on February 18, 2004, which demonstrated some frequent atrial ectopies and intermittent short runs of atrial tachycardia and fibrillation and rare PVCs.  He was advised to take a beta blocker, but he has been very reluctant to take a beta blocker because of fear of sexual dysfunction. He has had no sustained tachycardia thus far. Because of recurrent chest pain, he underwent cardiac catheterization on March 7, 2005, which demonstrated:    1. 60% stenosis of the left main trunk.    2. Total occlusion of the LAD in the proximal segment.    3. Severe, diffuse disease of the circumflex artery, with total occlusion.    4. 80% stenosis of the ostium of the ramus intermedius.    5. Patent dominant right coronary artery, with 10% stenosis.    6. Patent SVG to the proximal LAD, but total occlusion of the vein graft to the distal LAD.    7. Total occlusion of the vein graft to the distal RCA.    8. Normal left ventricular systolic function, with EF in the 60% range.    It was felt that he could be best treated medically. He underwent stress nuclear cardiac imaging on May 7, 2007, at which time he was able to exercise 9-minutes, with no chest pain or EKG changes. His nuclear imaging demonstrated mild scarring and ischemia in the inferior wall. The ejection fraction was estimated in the 72% range. He had repeat stress nuclear cardiac imaging on August 20, 2009 at which time he was able to exercise 9 minutes and 35 seconds with no chest pain or EKG changes. Perfusion imaging was essentially unchanged in comparison to the one from May 7, 2007.    He had repeat stress test as a Lexiscan Cardiolite myocardial perfusion imaging on 8/2/11 which was essentially unchanged and negative.    His follow up stress nuclear cardiac imaging on 7/3/2012 demonstrated normal perfusion with no evidence of scaring or ischemia. The ejection fraction was estimated in the 70% range.    Because of the continued dyspnea on exertion, he had repeat cardiac catheterization on 12/6/12 to rule out any atypical presentation of angina. His coronary angiogram demonstrated new 60-85% diffuse narrowing in the native LAD at the insertion of the vein graft to the proximal LAD.  The remainder of the coronary anatomy was unchanged and there was no evidence of restenosis of the stented right coronary artery. LVEDP was up to 18 mmHg. The left ventricular ejection fraction was in the 60% range.    The native LAD was subsequently stented with the 2.25 x 30 mm Resolute stent successfully.    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 His CT scan of the chest demonstrated evidence of COPD with emphysema in the upper lobes and mosaic attenuation which is widespread and likely secondary to small airway disease. There was also some bronchial thickening and mild cylindrical bronchiectasis. He was subsequently referred to a pulmonologist who advised him that he has a chronic bronchitis. He was started on inhalers with very little help.    He underwent stress nuclear cardiac imaging for follow up on 1/28/15, which demonstrated normal perfusion with no evidence of scarring or ischemia. The ejection fraction was in the 70% range.   He underwent followup stress nuclear cardiac imaging on 01/16/2017, which demonstrated a small area of fixed perfusion defect in the basal inferior wall most likely related to diaphragmatic attenuation with no other perfusion defect.  Ejection fraction was in the range of 65%. Review of Systems   Constitutional: Negative for malaise/fatigue and weight loss. HENT: Negative for hearing loss. Eyes: Negative for blurred vision and double vision. Respiratory: Positive for cough and shortness of breath. Cardiovascular: Negative for chest pain, palpitations, orthopnea, claudication, leg swelling and PND. Gastrointestinal: Negative for blood in stool, heartburn and melena. Genitourinary: Positive for frequency. Negative for dysuria, hematuria and urgency. Musculoskeletal: Negative for back pain, joint pain, myalgias and neck pain. Skin: Negative for itching and rash.    Neurological: Negative for dizziness, loss of consciousness and weakness. Psychiatric/Behavioral: Negative for depression and memory loss. Physical Exam   Constitutional: He is oriented to person, place, and time. He appears well-developed and well-nourished. HENT:   Head: Normocephalic and atraumatic. Eyes: Conjunctivae are normal. Pupils are equal, round, and reactive to light. Neck: Normal range of motion. Neck supple. No JVD present. Cardiovascular: S1 normal and S2 normal.  An irregularly irregular rhythm present. No extrasystoles are present. Tachycardia present. PMI is not displaced. Exam reveals no gallop and no friction rub. No murmur heard. Pulses:       Carotid pulses are 3+ on the right side, and 3+ on the left side. Pulmonary/Chest: Effort normal. He has rales. Abdominal: Soft. There is no tenderness. Musculoskeletal: He exhibits no edema. Neurological: He is alert and oriented to person, place, and time. No cranial nerve deficit. Skin: Skin is warm and dry. Psychiatric: He has a normal mood and affect. His behavior is normal.     Visit Vitals    /72    Pulse (!) 120    Ht 5' 6\" (1.676 m)    Wt 69.4 kg (153 lb)    SpO2 95%    BMI 24.69 kg/m2       Past Medical History:   Diagnosis Date    Arrhythmia     Bronchitis 6/2013        CABG x 4 2001    CAD (coronary artery disease)     Cardiac cath 12/06/2012    RCA prev stent patent. LM severe but patent (supplies pLAD & CX). dLAD 60-85% (2.25 x 30-mm Resolute stent, resid 0%). SVG to % (known). SVG to dLAD 100% (known). Seq SVG to pLAD & RI patent. LVEDP 18.  EF 60%. Poss mild mid anterior hypk.  Cardiac nuclear imaging test, low risk 01/16/2017    Low risk. Sm basal inferior fixed defect, likely artifact. No ischemia. No WMA. EF 65%. Neg EKG on pharm stress test.  Unchanged from study of 1/28/15.     Chest pain     atypical    Chronic obstructive pulmonary disease (HCC)     Coronary artery disease     Status post CABG x 4, with stenting of the native RCA in Dec 2001/ EF 60%    HAYWARD (dyspnea on exertion)     Elevated liver enzymes     mild    Hypercholesterolemia     Hypertension     Palpitations     PVC's (premature ventricular contractions)     Unspecified sleep apnea     wife stated pt's doctor aware  is unable to jazmin use of cpap    Vertigo     mild       Social History     Social History    Marital status:      Spouse name: N/A    Number of children: N/A    Years of education: N/A     Occupational History    Not on file. Social History Main Topics    Smoking status: Never Smoker    Smokeless tobacco: Never Used    Alcohol use Yes      Comment: 10 drinks per week    Drug use: No    Sexual activity: Not on file     Other Topics Concern    Not on file     Social History Narrative       Family History   Problem Relation Age of Onset    Hypertension Brother     Arthritis-osteo Brother     Cancer Mother      bone and breast cancer       Past Surgical History:   Procedure Laterality Date    HAND/FINGER SURGERY UNLISTED  11-16-11    right   [address] ANGIOPLASTY  12/2001, 2012    with stenting of native RCA    HX CHOLECYSTECTOMY  2010    HX CORONARY ARTERY BYPASS GRAFT  6/1996    x 4; Single SVG to distal LAD; sequential SVG to proximal LAD & intermediate marginal branch; single SVG to distal RCA    HX HEENT      CATARACT    HX HERNIA REPAIR  2007       Current Outpatient Prescriptions   Medication Sig Dispense Refill    levoFLOXacin (LEVAQUIN) 750 mg tablet       BREO ELLIPTA 200-25 mcg/dose inhaler       clopidogrel (PLAVIX) 75 mg tab Take 1 Tab by mouth daily. 90 Tab 3    metoprolol tartrate (LOPRESSOR) 25 mg tablet TAKE ONE TABLET BY MOUTH TWICE DAILY 60 Tab 6    levothyroxine (SYNTHROID) 50 mcg tablet Take 50 mcg by mouth daily.  LORazepam (ATIVAN) 1 mg tablet Take  by mouth every six (6) hours as needed.       ondansetron hcl (ZOFRAN) 4 mg tablet Take 1 tablet by mouth every eight (8) hours as needed for Nausea. 14 tablet 0    VENTOLIN HFA 90 mcg/actuation inhaler INHALE TWO PUFFS TWICE DAILY 17 g 2    simvastatin (ZOCOR) 80 mg tablet Take 80 mg by mouth nightly.  nitroglycerin (NITROSTAT) 0.4 mg SL tablet 1 Tab by SubLINGual route every five (5) minutes as needed for Chest Pain. 25 Tab 3    omeprazole (PRILOSEC OTC) 20 mg tablet Take 20 mg by mouth as needed.  cetirizine (ZYRTEC) 10 mg tablet Take  by mouth as needed.  zolpidem (AMBIEN) 10 mg tablet Take 12.5 mg by mouth nightly.  cyanocobalamin (VITAMIN B-12) 1,000 mcg tablet Take 1,000 mcg by mouth daily.  coenzyme q10 (CO Q-10) 10 mg Cap Take 100 mg by mouth daily. EKG: atrial fibrillation, rate fast, RBBB, left axis deviation, AF new since 7/25/17  . ASSESSMENT and PLAN  Encounter Diagnoses   Name Primary?  New onset a-fib ([address] Yes    Atherosclerosis of native coronary artery of native heart without angina pectoris,status post CABG X4 in June 1996, with stenting of the native RCA in December 2001/ EF 60%; s/p stenting in the LAD 12     Rales     Pulmonary fibrosis ([address]     HAYWARD (dyspnea on exertion)     Sleep apnea, obstructive    He has developed new onset atrial fibrillation, but he is completely asymptomatic in terms of palpitations. He does have predisposing conditions to atrial fibrillation such as sleep apnea and pulmonary fibrosis. Unfortunately, he has not been able to tolerate the CPAP. He had a recent pulmonary infection with coughing and shortness of breath. This could have precipitated the atrial fibrillation as well. He does have chronic dry rales from the pulmonary fibrosis, which makes it difficult to evaluate any possibility of lung infection. For this, I would obtain a chest x-ray. I would also obtain a proBNP level to rule out any possibility of diastolic heart failure.       We discussed the atrial fibrillation and its issues with heart rate control and embolic stroke risk. We discussed conservative treatment with heart rate control and anticoagulation, as well as rhythm control. At this point, I would initiate anticoagulation following evaluation of his renal function. If his renal function is adequate, I would start him on Eliquis. I would also start him on a betablocker for the control of his heart rate and obtain a Holter monitor in two weeks to further evaluate the adequacy of heart rate control. I will see him again in a few months and we will discuss further treatment options, including atrial fibrillation ablation treatment, which does not appear to be clearly indicated at this time since he has been essentially asymptomatic from the atrial fibrillation in terms of palpitations.

## 2017-12-06 DIAGNOSIS — I49.3 PVC'S (PREMATURE VENTRICULAR CONTRACTIONS): ICD-10-CM

## 2017-12-06 DIAGNOSIS — I51.9 DIASTOLIC DYSFUNCTION, LEFT VENTRICLE: Primary | ICD-10-CM

## 2017-12-06 DIAGNOSIS — R06.09 DOE (DYSPNEA ON EXERTION): ICD-10-CM

## 2017-12-06 LAB
BASOPHILS # BLD: 0 K/UL (ref 0–0.1)
BASOPHILS NFR BLD: 0 % (ref 0–3)
DIFFERENTIAL METHOD BLD: ABNORMAL
EOSINOPHIL # BLD: 0.1 K/UL (ref 0–0.4)
EOSINOPHIL NFR BLD: 2 % (ref 0–5)
ERYTHROCYTE [DISTWIDTH] IN BLOOD BY AUTOMATED COUNT: 12.9 % (ref 11.6–14.5)
HCT VFR BLD AUTO: 37.5 % (ref 36–48)
HGB BLD-MCNC: 12.6 G/DL (ref 13–16)
LYMPHOCYTES # BLD: 0.8 K/UL (ref 0.8–3.5)
LYMPHOCYTES NFR BLD: 14 % (ref 20–51)
MCH RBC QN AUTO: 31.8 PG (ref 24–34)
MCHC RBC AUTO-ENTMCNC: 33.6 G/DL (ref 31–37)
MCV RBC AUTO: 94.7 FL (ref 74–97)
MONOCYTES # BLD: 0.7 K/UL (ref 0–1)
MONOCYTES NFR BLD: 13 % (ref 2–9)
NEUTS BAND NFR BLD MANUAL: 4 % (ref 0–5)
NEUTS SEG # BLD: 3.7 K/UL (ref 1.8–8)
NEUTS SEG NFR BLD: 67 % (ref 42–75)
PLATELET # BLD AUTO: 199 K/UL (ref 135–420)
PLATELET COMMENTS,PCOM: ABNORMAL
PMV BLD AUTO: 9.3 FL (ref 9.2–11.8)
RBC # BLD AUTO: 3.96 M/UL (ref 4.7–5.5)
RBC MORPH BLD: ABNORMAL
WBC # BLD AUTO: 5.5 K/UL (ref 4.6–13.2)
WBC MORPH BLD: ABNORMAL

## 2017-12-06 RX ORDER — POTASSIUM CHLORIDE 20 MEQ/1
20 TABLET, EXTENDED RELEASE ORAL 2 TIMES DAILY
Qty: 30 TAB | Refills: 6 | Status: SHIPPED | OUTPATIENT
Start: 2017-12-06 | End: 2018-01-23

## 2017-12-06 RX ORDER — FUROSEMIDE 40 MG/1
40 TABLET ORAL DAILY
Qty: 30 TAB | Refills: 6 | Status: SHIPPED | OUTPATIENT
Start: 2017-12-06 | End: 2018-01-23

## 2017-12-12 ENCOUNTER — HOSPITAL ENCOUNTER (OUTPATIENT)
Dept: NON INVASIVE DIAGNOSTICS | Age: 72
Discharge: HOME | End: 2017-12-12
Attending: INTERNAL MEDICINE
Payer: MEDICARE

## 2017-12-12 ENCOUNTER — HOSPITAL ENCOUNTER (OUTPATIENT)
Dept: LAB | Age: 72
Discharge: HOME | End: 2017-12-12
Attending: INTERNAL MEDICINE
Payer: MEDICARE

## 2017-12-12 DIAGNOSIS — I48.91 NEW ONSET A-FIB (HCC): ICD-10-CM

## 2017-12-12 LAB
ANION GAP SERPL CALC-SCNC: 6 MMOL/L (ref 3–18)
BNP SERPL-MCNC: 455 PG/ML (ref 0–900)
BUN SERPL-MCNC: 23 MG/DL (ref 7–18)
BUN/CREAT SERPL: 21 (ref 12–20)
CALCIUM SERPL-MCNC: 8.8 MG/DL (ref 8.5–10.1)
CHLORIDE SERPL-SCNC: 101 MMOL/L (ref 100–108)
CO2 SERPL-SCNC: 30 MMOL/L (ref 21–32)
CREAT SERPL-MCNC: 1.09 MG/DL (ref 0.6–1.3)
GLUCOSE SERPL-MCNC: 125 MG/DL (ref 74–99)
POTASSIUM SERPL-SCNC: 4.6 MMOL/L (ref 3.5–5.5)
SODIUM SERPL-SCNC: 137 MMOL/L (ref 136–145)

## 2017-12-12 PROCEDURE — 83880 ASSAY OF NATRIURETIC PEPTIDE: CPT | Performed by: INTERNAL MEDICINE

## 2017-12-12 PROCEDURE — 80048 BASIC METABOLIC PNL TOTAL CA: CPT | Performed by: INTERNAL MEDICINE

## 2017-12-12 PROCEDURE — 36415 COLL VENOUS BLD VENIPUNCTURE: CPT | Performed by: INTERNAL MEDICINE

## 2017-12-12 PROCEDURE — 93225 XTRNL ECG REC<48 HRS REC: CPT | Performed by: INTERNAL MEDICINE

## 2017-12-13 DIAGNOSIS — J44.9 CHRONIC OBSTRUCTIVE PULMONARY DISEASE, UNSPECIFIED COPD TYPE (HCC): ICD-10-CM

## 2017-12-13 DIAGNOSIS — J84.10 PULMONARY FIBROSIS (HCC): Primary | ICD-10-CM

## 2017-12-13 NOTE — PROGRESS NOTES
Verbal Order with read back per Dr. Latricia Moses MD  For PFT smart panel. AMB POC PFT complete w/ bronchodilator  AMB POC PFT complete w/o bronchodilator  Gas Dilute/ wash out lung vol w/wo distrib vet & vol  Diffusing capacity    Dr. Latricia Moses MD will co-sign the orders.

## 2017-12-18 ENCOUNTER — TELEPHONE (OUTPATIENT)
Dept: CARDIOLOGY CLINIC | Age: 72
End: 2017-12-18

## 2017-12-18 ENCOUNTER — OFFICE VISIT (OUTPATIENT)
Dept: ORTHOPEDIC SURGERY | Age: 72
End: 2017-12-18

## 2017-12-18 VITALS
OXYGEN SATURATION: 98 % | TEMPERATURE: 97.3 F | WEIGHT: 148.6 LBS | HEIGHT: 66 IN | DIASTOLIC BLOOD PRESSURE: 52 MMHG | BODY MASS INDEX: 23.88 KG/M2 | HEART RATE: 54 BPM | SYSTOLIC BLOOD PRESSURE: 134 MMHG

## 2017-12-18 DIAGNOSIS — M25.572 ACUTE BILATERAL ANKLE PAIN: ICD-10-CM

## 2017-12-18 DIAGNOSIS — M25.571 ACUTE BILATERAL ANKLE PAIN: ICD-10-CM

## 2017-12-18 DIAGNOSIS — M67.88 ACHILLES TENDINOSIS: Primary | ICD-10-CM

## 2017-12-18 RX ORDER — BENZONATATE 200 MG/1
200 CAPSULE ORAL
COMMUNITY

## 2017-12-18 NOTE — PROGRESS NOTES
AMBULATORY PROGRESS NOTE      Patient: Adalberto Owens             MRN: [id number]     SSN: [id number] Body mass index is 23.98 kg/(m^2). YOB: 1945     AGE: 74 y.o. EX: male    PCP: Shukri Dorsey NP    IMPRESSION/DIAGNOSIS AND TREATMENT PLAN     DIAGNOSES  1. Achilles tendinosis    2. Acute bilateral ankle pain        Orders Placed This Encounter    [13315] Ankle Min 3V    [10190] Ankle [facility] understands his diagnoses and the proposed plan. Plan:    1) HEP     RTO     HPI AND EXAMINATION     Adalberto Owens IS A 74 y.o. male who presents to my outpatient office complaining of bilateral foot pain. He rates his pain at 7/10 today. The patient, Isabela Roberts, is accompanied here by his wife. He is a 75-year-old male who has a history of congestive heart failure and on December 4, 2017, he describes being placed on Levaquin (Levafloxacin) for pneumonia. Within two or three days of taking this, he developed bilateral calf pain and bilateral insertional Achilles tendon pain and noninsertional Achilles tendon pain to the point where he could barely walk. He stopped the antibiotic and he is here for assessment. He states his pain is much improved, but he is still having activity-related pain after taking Levofloxacin for about 10 days. IMPRESSION:     1. Bilateral noninsertional Achilles tendinitis, tendinosis. 2. Mild bilateral medial calf, gastrocnemius muscle, pain. Recommendation is for activity modification. I did talk to him about a CAM walker boot, but after a lengthy discussion, I thought we would just hold off on the CAM walker boot, as I did not want to have it cause him gait disturbance and bother his hips and make it too difficult for him to walk. So, we are going to modify his activities and really have him take it easy. We will reassess him in four weeks.   No running, no golf, no biking, and no treadmill, just gentle Achilles tendon stretching was recommended for him, where he leans against the wall. This was demonstrated for him. Otherwise, just basic walking for him at this point in time. I do not recommend any cortisone injections on him. ANKLE/FOOT bilateral     Psychiatry: Alert, Oriented x 3; Speech normal in context and clarity,            Memory intact grossly, no involuntary movements - tremors, no dementia  Gait: slow  Tenderness: moderate tender achilles tendon with deformity palpable:      Noninsertional tendinopathy   Swelling: moderate  Calf tenderness: Absent for calf or gastrocnemius muscle regions   Soft, supple, non tender, non taut lower extremity compartments   NONE to Medial Malleolar, 4/5 Met base midfoot, achilles, tib post, or   NONE to syndesmosis. no to plantar fascia, or central calcaneal region  Cutaneous: WNL, Joint Motion: WNL   Joint / Tendon Stability: No Ankle or Subtalar instability or joint laxity. No peroneal sublux ability or dislocation  Alignment: neutral Hindfoot, none Metatarsus Adductus Metatarsus. Neuro Motor/Sensory: NL/NL, Vascular: NL foot/ankle pulses  Lymphatics: No extremity lymphedema, No calf swelling, no tenderness to calf muscles. CHART REVIEW     Past Medical History:   Diagnosis Date    Arrhythmia     Bronchitis 6/2013        CABG x 4 2001    CAD (coronary artery disease)     Cardiac cath 12/06/2012    RCA prev stent patent. LM severe but patent (supplies pLAD & CX). dLAD 60-85% (2.25 x 30-mm Resolute stent, resid 0%). SVG to % (known). SVG to dLAD 100% (known). Seq SVG to pLAD & RI patent. LVEDP 18.  EF 60%. Poss mild mid anterior hypk.  Cardiac nuclear imaging test, low risk 01/16/2017    Low risk. Sm basal inferior fixed defect, likely artifact. No ischemia. No WMA. EF 65%. Neg EKG on pharm stress test.  Unchanged from study of 1/28/15.     Chest pain     atypical    Chronic obstructive pulmonary disease ([facility] [address]     Coronary artery disease     Status post CABG x 4, with stenting of the native RCA in Dec 2001/ EF 60%    HAYWARD (dyspnea on exertion)     Elevated liver enzymes     mild    Hypercholesterolemia     Hypertension     Palpitations     PVC's (premature ventricular contractions)     Unspecified sleep apnea     wife stated pt's doctor aware  is unable to jazmin use of cpap    Vertigo     mild     Current Outpatient Prescriptions   Medication Sig    benzonatate (TESSALON) 200 mg capsule Take 200 mg by mouth three (3) times daily as needed for Cough.  furosemide (LASIX) 40 mg tablet Take 1 Tab by mouth daily.  potassium chloride (K-DUR, KLOR-CON) 20 mEq tablet Take 1 Tab by mouth two (2) times a day.  levoFLOXacin (LEVAQUIN) 750 mg tablet     BREO ELLIPTA 200-25 mcg/dose inhaler     metoprolol tartrate (LOPRESSOR) 50 mg tablet Take 1 Tab by mouth two (2) times a day.  apixaban (ELIQUIS) 5 mg tablet Take 1 Tab by mouth two (2) times a day.  levothyroxine (SYNTHROID) 50 mcg tablet Take 50 mcg by mouth daily.  LORazepam (ATIVAN) 1 mg tablet Take  by mouth every six (6) hours as needed.  simvastatin (ZOCOR) 80 mg tablet Take 80 mg by mouth nightly.  omeprazole (PRILOSEC OTC) 20 mg tablet Take 20 mg by mouth as needed.  cetirizine (ZYRTEC) 10 mg tablet Take  by mouth as needed.  zolpidem (AMBIEN) 10 mg tablet Take 12.5 mg by mouth nightly.  cyanocobalamin (VITAMIN B-12) 1,000 mcg tablet Take 1,000 mcg by mouth daily.  coenzyme q10 (CO Q-10) 10 mg Cap Take 100 mg by mouth daily.  clopidogrel (PLAVIX) 75 mg tab Take 1 Tab by mouth daily.  ondansetron hcl (ZOFRAN) 4 mg tablet Take 1 tablet by mouth every eight (8) hours as needed for Nausea.  VENTOLIN HFA 90 mcg/actuation inhaler INHALE TWO PUFFS TWICE DAILY    nitroglycerin (NITROSTAT) 0.4 mg SL tablet 1 Tab by SubLINGual route every five (5) minutes as needed for Chest Pain.      No current facility-administered medications for this visit. Allergies   Allergen Reactions    Lipitor [Atorvastatin] Other (comments)     Joint pain     Past Surgical History:   Procedure Laterality Date    HAND/FINGER SURGERY UNLISTED  11-16-11    right    HX ANGIOPLASTY  12/2001, 2012    with stenting of native RCA    HX CHOLECYSTECTOMY  2010    HX CORONARY ARTERY BYPASS GRAFT  6/1996    x 4; Single SVG to distal LAD; sequential SVG to proximal LAD & intermediate marginal branch; single SVG to distal RCA    HX HEENT      CATARACT    HX HERNIA REPAIR  2007     Social History     Occupational History    Not on file. Social History Main Topics    Smoking status: Never Smoker    Smokeless tobacco: Never Used    Alcohol use Yes      Comment: 10 drinks per week    Drug use: No    Sexual activity: Not on file     Family History   Problem Relation Age of Onset    Hypertension Brother     Arthritis-osteo Brother     Cancer Mother      bone and breast cancer       REVIEW OF SYSTEMS : 12/18/2017  ALL BELOW ARE Negative except : SEE HPI       Constitutional: Negative for fever, chills and weight loss. Neg Weigh Loss  Cardiovascular: Negative for chest pain, claudication and leg swelling. SOB, HAYWARD   Gastrointestinal: Negative for  pain, N/V/D/C, Blood in stool or urine,dysuria, hematuria,        Incontinence, pelvic pain  Musculoskeletal: see HPI. Neurological: Negative for dizziness and weakness. Negative for headaches,Visual Changes, Confusion, Seizures,   Psychiatric/Behavioral: Negative for depression, memory loss and substance abuse. Extremities:  Negative for  hair changes, rash or skin lesion changes. Hematologic: Negative for Bleeding problems, bruising, pallor or swollen lymph nodes.   Peripheral Vascular: No calf pain, vascular vein tenderness to calf pain              No calf throbbing, posterior knee throbbing pain    DIAGNOSTIC IMAGING     No notes on file    Written by Vincenzo Lindsay Bere Villaseñor, as dictated by Michelle Martinez MD. I, DrAldo, Michelle Martinez MD, confirm that all documentation is accurate.

## 2017-12-18 NOTE — MR AVS SNAPSHOT
Visit Information Date & Time Provider Department Dept. Phone Encounter #  
 12/18/2017 10:30 AM Dutch Coats MD [facility] [phone] [id number] Your Appointments 12/26/2017 10:00 AM  
Nurse Visit with [facility] [address] ([address]) Appt Note: NP APPT [email or web address]  
 [address] [facility], [address] [address]  
[phone]  
  
   
 [address] [place] [address], [address] [place] [address]  
  
    
 12/26/2017 11:00 AM  
New Patient with Taylor Tirado MD  
[address] ([address]) Appt Note: DR BURKETT [facility] [email or web address] [email or web address]  
 [address] [facility], [address]  
  
   
 [address] [place] [address], [address] [place] [address]  
  
    
 1/23/2018  9:00 AM  
Follow Up with Sumi Colin MD  
[facility] ([address]) Appt Note: 6 month f/up [facility] [address] [address] [address] [phone] [address] [place] [address] [address] Upcoming Health Maintenance Date Due FOBT Q 1 YEAR AGE 50-75 1/26/1993 ZOSTER VACCINE AGE 60> 11/26/2002 GLAUCOMA SCREENING Q2Y 1/26/2008 Pneumococcal 65+ Low/Medium Risk (1 of 2 - PCV13) 1/26/2008 MEDICARE YEARLY EXAM 1/26/2008 DTaP/Tdap/Td series (2 - Td) 10/6/2027 Allergies as of 12/18/2017  Review Complete On: 12/18/2017 By: Dutch Coats MD  
  
 Severity Noted Reaction Type Reactions Lipitor [Atorvastatin]  03/06/2012    Other (comments) Joint pain Current Immunizations  Never Reviewed Name Date Tdap 10/6/2017  3:17 PM  
  
 Not reviewed this visit You Were Diagnosed With   
  
 Codes Comments Acute bilateral ankle pain    -  Primary ICD-10-CM: M25.571, M25.572 ICD-9-CM: 719.47, 338.19 Vitals BP Pulse Temp Height(growth percentile) Weight(growth percentile) SpO2  
 134/52 (!) 54 97.3 °F (36.3 °C) (Oral) 5' 6\" (1.676 m) 148 lb 9.6 oz (67.4 kg) 98% BMI Smoking Status 23.98 kg/m2 Never Smoker Vitals History BMI and BSA Data Body Mass Index Body Surface Area  
 23.98 kg/m 2 1.77 m 2 Preferred Pharmacy Pharmacy Name Phone [facility] [address] [facility] [phone] Your Updated Medication List  
  
   
This list is accurate as of: 12/18/17 12:08 PM.  Always use your most recent med list.  
  
  
  
  
 AMBIEN 10 mg tablet Generic drug:  zolpidem Take 12.5 mg by mouth nightly. apixaban 5 mg tablet Commonly known as:  Timmothy Tremont Take 1 Tab by mouth two (2) times a day. benzonatate 200 mg capsule Commonly known as:  TESSALON Take 200 mg by mouth three (3) times daily as needed for Cough. BREO ELLIPTA 200-25 mcg/dose inhaler Generic drug:  fluticasone-vilanterol  
  
 clopidogrel 75 mg Tab Commonly known as:  PLAVIX Take 1 Tab by mouth daily. CO Q-10 10 mg Cap Generic drug:  coenzyme q10 Take 100 mg by mouth daily. furosemide 40 mg tablet Commonly known as:  LASIX Take 1 Tab by mouth daily. levoFLOXacin 750 mg tablet Commonly known as:  LEVAQUIN  
  
 levothyroxine 50 mcg tablet Commonly known as:  SYNTHROID Take 50 mcg by mouth daily. LORazepam 1 mg tablet Commonly known as:  ATIVAN Take  by mouth every six (6) hours as needed. metoprolol tartrate 50 mg tablet Commonly known as:  LOPRESSOR Take 1 Tab by mouth two (2) times a day. nitroglycerin 0.4 mg SL tablet Commonly known as:  NITROSTAT  
1 Tab by SubLINGual route every five (5) minutes as needed for Chest Pain. ondansetron hcl 4 mg tablet Commonly known as:  Jam Catching Take 1 tablet by mouth every eight (8) hours as needed for Nausea. potassium chloride 20 mEq tablet Commonly known as:  K-DUR, KLOR-CON Take 1 Tab by mouth two (2) times a day. PriLOSEC OTC 20 mg tablet Generic drug:  omeprazole Take 20 mg by mouth as needed. VENTOLIN HFA 90 mcg/actuation inhaler Generic drug:  albuterol INHALE TWO PUFFS TWICE DAILY  
  
 VITAMIN B-12 1,000 mcg tablet Generic drug:  cyanocobalamin Take 1,000 mcg by mouth daily. ZOCOR 80 mg tablet Generic drug:  simvastatin Take 80 mg by mouth nightly. ZyrTEC 10 mg tablet Generic drug:  cetirizine Take  by mouth as needed. We Performed the Following AMB POC XRAY, ANKLE; COMPLETE, 3+ VIE [39268 CPT(R)] AMB POC XRAY, ANKLE; COMPLETE, 3+ VIE [14276 CPT(R)] To-Do List   
 12/22/2017 12:30 PM  
  Appointment with [facility]- IE[facility] MACHINE (WT ) at for; to (do) NON-INVASIVE CARD ([phone]) Age Limit for ALL Heart procedures @ all [address] [place] [address] facilities: 18 yrs and older only. Under the age of 25, refer to [facility] ([phone]). Wt Limit: 350lbs. This study requires patient to bring a written physician's order or MD office may fax the order to Central Scheduling at [phone]. Patient needs to bring a current list of all medications. No preparation is required for this study. Patients should report 15 minutes prior to their appointment time to the [address]. Patient Instructions Achilles Tendon: Exercises Your Care Instructions Here are some examples of exercises for your achilles tendon. Start each exercise slowly. Ease off the exercise if you start to have pain. Your doctor or physical therapist will tell you when you can start these exercises and which ones will work best for you. How to do the exercises Toe stretch 1. Sit in a chair, and extend your affected leg so that your heel is on the floor. 2. With your hand, reach down and pull your big toe up and back.  Pull toward your ankle and away from the floor. 3. Hold the position for at least 15 to 30 seconds. 4. Repeat 2 to 4 times a session, several times a day. Calf-plantar fascia stretch 1. Sit with your legs extended and knees straight. 2. Place a towel around your foot just under the toes. 3. Hold each end of the towel in each hand, with your hands above your knees. 4. Pull back with the towel so that your foot stretches toward you. 5. Hold the position for at least 15 to 30 seconds. 6. Repeat 2 to 4 times a session, up to 5 sessions a day. Floor stretch 1. Stand about 2 feet from a wall, and place your hands on the wall at about shoulder height. Or you can stand behind a chair, placing your hands on the back of it for balance. 2. Step back with the leg you want to stretch. Keep the leg straight, and press your heel into the floor with your toe turned slightly in. 
3. Lean forward, and bend your other leg slightly. Feel the stretch in the Achilles tendon of your back leg. Hold for at least 15 to 30 seconds. 4. Repeat 2 to 4 times a session, up to 5 sessions a day. Stair stretch 1. Stand with the balls of both feet on the edge of a step or curb (or a medium-sized phone book). With at least one hand, hold onto something solid for balance, such as a banister or handrail. 2. Keeping your affected leg straight, slowly let that heel hang down off of the step or curb until you feel a stretch in the back of your calf and/or Achilles area. Some of your weight should still be on the other leg. 3. Hold this position for at least 15 to 30 seconds. 4. Repeat 2 to 4 times a session, up to 5 times a day or whenever your Achilles tendon starts to feel tight. This stretch can also be done with your knee slightly bent. Strength exercise 1. This exercise will get you started on building strength after an Achilles tendon injury.  Your doctor or physical therapist can help you move on to more challenging exercises as you heal and get stronger. 2. Stand on a step with your heel off the edge of the step. Hold on to a handrail or wall for balance. 3. Push up on your toes, then slowly count to 10 as you lower yourself back down until your heel is below the step. If it hurts to push up on your toes, try putting most of your weight on your other foot as you push up, or try using your arms to help you. If you can't do this exercise without causing pain, stop the exercise and talk to your doctor. 4. Repeat the exercise 8 to 12 times, half with the knee straight and half with the knee bent. Follow-up care is a key part of your treatment and safety. Be sure to make and go to all appointments, and call your doctor if you are having problems. It's also a good idea to know your test results and keep a list of the medicines you take. Where can you learn more? Go to http://lena-may.info/. Enter M149 in the search box to learn more about \"Achilles Tendon: Exercises. \" Current as of: March 21, 2017 Content Version: 11.4 © 4432-7578 [employer]. Care instructions adapted under license by [employer] (which disclaims liability or warranty for this information). If you have questions about a medical condition or this instruction, always ask your healthcare professional. [place] [address] any warranty or liability for your use of this information. Introducing [facility]! Alexandra Umanzor introduces [employer] patient portal. Now you can access parts of your medical record, email your doctor's office, and request medication refills online. 1. In your internet browser, go to [email or web address] 2. Click on the First Time User? Click Here link in the Sign In box. You will see the New Member Sign Up page. 3. Enter your [employer] Access Code exactly as it appears below.  You will not need to use this code after youve completed the sign-up process. If you do not sign up before the expiration date, you must request a new code. · EasyLink Access Code: [email or web address] Expires: 3/5/2018 12:42 PM 
 
4. Enter the last four digits of your Social Security Number (xxxx) and Date of Birth (mm/dd/yyyy) as indicated and click Submit. You will be taken to the next sign-up page. 5. Create a EasyLink ID. This will be your EasyLink login ID and cannot be changed, so think of one that is secure and easy to remember. 6. Create a EasyLink password. You can change your password at any time. 7. Enter your Password Reset Question and Answer. This can be used at a later time if you forget your password. 8. Enter your e-mail address. You will receive e-mail notification when new information is available in [address]. 9. Click Sign Up. You can now view and download portions of your medical record. 10. Click the Download Summary menu link to download a portable copy of your medical information. If you have questions, please visit the Frequently Asked Questions section of the EasyLink website. Remember, EasyLink is NOT to be used for urgent needs. For medical emergencies, dial 911. Now available from your iPhone and Android! Please provide this summary of care documentation to your next provider. Your primary care clinician is listed as [facility]. If you have any questions after today's visit, please call [phone].

## 2017-12-18 NOTE — PATIENT INSTRUCTIONS
Achilles Tendon: Exercises  Your Care Instructions  Here are some examples of exercises for your achilles tendon. Start each exercise slowly. Ease off the exercise if you start to have pain. Your doctor or physical therapist will tell you when you can start these exercises and which ones will work best for you. How to do the exercises  Toe stretch    1. Sit in a chair, and extend your affected leg so that your heel is on the floor. 2. With your hand, reach down and pull your big toe up and back. Pull toward your ankle and away from the floor. 3. Hold the position for at least 15 to 30 seconds. 4. Repeat 2 to 4 times a session, several times a day. Calf-plantar fascia stretch    1. Sit with your legs extended and knees straight. 2. Place a towel around your foot just under the toes. 3. Hold each end of the towel in each hand, with your hands above your knees. 4. Pull back with the towel so that your foot stretches toward you. 5. Hold the position for at least 15 to 30 seconds. 6. Repeat 2 to 4 times a session, up to 5 sessions a day. Floor stretch    1. Stand about 2 feet from a wall, and place your hands on the wall at about shoulder height. Or you can stand behind a chair, placing your hands on the back of it for balance. 2. Step back with the leg you want to stretch. Keep the leg straight, and press your heel into the floor with your toe turned slightly in.  3. Lean forward, and bend your other leg slightly. Feel the stretch in the Achilles tendon of your back leg. Hold for at least 15 to 30 seconds. 4. Repeat 2 to 4 times a session, up to 5 sessions a day. Stair stretch    1. Stand with the balls of both feet on the edge of a step or curb (or a medium-sized phone book). With at least one hand, hold onto something solid for balance, such as a banister or handrail.   2. Keeping your affected leg straight, slowly let that heel hang down off of the step or curb until you feel a stretch in the back of your calf and/or Achilles area. Some of your weight should still be on the other leg. 3. Hold this position for at least 15 to 30 seconds. 4. Repeat 2 to 4 times a session, up to 5 times a day or whenever your Achilles tendon starts to feel tight. This stretch can also be done with your knee slightly bent. Strength exercise    1. This exercise will get you started on building strength after an Achilles tendon injury. Your doctor or physical therapist can help you move on to more challenging exercises as you heal and get stronger. 2. Stand on a step with your heel off the edge of the step. Hold on to a handrail or wall for balance. 3. Push up on your toes, then slowly count to 10 as you lower yourself back down until your heel is below the step. If it hurts to push up on your toes, try putting most of your weight on your other foot as you push up, or try using your arms to help you. If you can't do this exercise without causing pain, stop the exercise and talk to your doctor. 4. Repeat the exercise 8 to 12 times, half with the knee straight and half with the knee bent. Follow-up care is a key part of your treatment and safety. Be sure to make and go to all appointments, and call your doctor if you are having problems. It's also a good idea to know your test results and keep a list of the medicines you take. Where can you learn more? Go to http://lena-may.info/. Enter Y062 in the search box to learn more about \"Achilles Tendon: Exercises. \"  Current as of: March 21, 2017  Content Version: 11.4  © 5021-2127 [employer]. Care instructions adapted under license by [employer] (which disclaims liability or warranty for this information). If you have questions about a medical condition or this instruction, always ask your healthcare professional. [address] any warranty or liability for your use of this information.

## 2017-12-22 ENCOUNTER — HOSPITAL ENCOUNTER (OUTPATIENT)
Dept: NON INVASIVE DIAGNOSTICS | Age: 72
Discharge: HOME | End: 2017-12-22
Attending: INTERNAL MEDICINE
Payer: MEDICARE

## 2017-12-22 DIAGNOSIS — I51.9 DIASTOLIC DYSFUNCTION, LEFT VENTRICLE: ICD-10-CM

## 2017-12-22 DIAGNOSIS — R06.09 DOE (DYSPNEA ON EXERTION): ICD-10-CM

## 2017-12-22 DIAGNOSIS — I49.3 PVC'S (PREMATURE VENTRICULAR CONTRACTIONS): ICD-10-CM

## 2017-12-22 PROCEDURE — 93306 TTE W/DOPPLER COMPLETE: CPT

## 2017-12-26 ENCOUNTER — OFFICE VISIT (OUTPATIENT)
Dept: PULMONOLOGY | Age: 72
End: 2017-12-26

## 2017-12-26 VITALS
TEMPERATURE: 97.8 F | RESPIRATION RATE: 21 BRPM | BODY MASS INDEX: 23.63 KG/M2 | DIASTOLIC BLOOD PRESSURE: 60 MMHG | OXYGEN SATURATION: 97 % | HEIGHT: 66 IN | HEART RATE: 64 BPM | WEIGHT: 147 LBS | SYSTOLIC BLOOD PRESSURE: 120 MMHG

## 2017-12-26 DIAGNOSIS — J84.10 PULMONARY FIBROSIS (HCC): ICD-10-CM

## 2017-12-26 RX ORDER — ASPIRIN 81 MG/1
TABLET ORAL DAILY
COMMUNITY
End: 2019-01-01

## 2017-12-26 NOTE — MR AVS SNAPSHOT
Visit Information Date & Time Provider Department Dept. Phone Encounter #  
 12/26/2017 11:00 AM Domi Souza MD [facility] [phone] Follow-up Instructions Return in about 4 weeks (around 1/23/2018). Your Appointments 1/15/2018  9:50 AM  
Follow Up with Ruth Mina MD  
[address] [facility], [address] and [facility] ([address]) Appt Note: ?  
 [address], [address] [facility]  
[phone] [address] [place] [address]  
  
    
 1/23/2018  9:00 AM  
Follow Up with Ashish Batista MD  
[facility] ([address] [place] [address]) Appt Note: 6 month f/up Consuelo Batista [id number]  
[phone] [place]  [id number]  
  
    
 1/23/2018 10:30 AM  
Follow Up with Domi Souza MD  
[address] ([address]) Appt Note: 4 wk fu from 12-26-17  
 [address] [place] [address], [address] [place] [address]  
[phone]  
  
   
 [address] [place] [address], [address] [facility],[address]  [address] [address] Upcoming Health Maintenance Date Due FOBT Q 1 YEAR AGE 50-75 1/26/1993 ZOSTER VACCINE AGE 60> 11/26/2002 GLAUCOMA SCREENING Q2Y 1/26/2008 Pneumococcal 65+ Low/Medium Risk (1 of 2 - PCV13) 1/26/2008 MEDICARE YEARLY EXAM 1/26/2008 DTaP/Tdap/Td series (2 - Td) 10/6/2027 Allergies as of 12/26/2017  Review Complete On: 12/26/2017 By: J Carlos Lang, RT Severity Noted Reaction Type Reactions Levofloxacin Medium 12/26/2017   Side Effect Swelling Lipitor [Atorvastatin]  03/06/2012    Other (comments) Joint pain Current Immunizations  Never Reviewed Name Date Tdap 10/6/2017  3:17 PM  
  
 Not reviewed this visit You Were Diagnosed With   
  
 Codes Comments Pulmonary fibrosis ([facility] [address]     ICD-10-CM: J84.10 ICD-9-CM: 168 Vitals BP Pulse Temp Resp Height(growth percentile) Weight(growth percentile) 120/60 (BP 1 Location: Left arm, BP Patient Position: At rest) 64 97.8 °F (36.6 °C) (Oral) 21 5' 6\" (1.676 m) 147 lb (66.7 kg) SpO2 BMI Smoking Status 97% 23.73 kg/m2 Never Smoker BMI and BSA Data Body Mass Index Body Surface Area  
 23.73 kg/m 2 1.76 m 2 Preferred Pharmacy Pharmacy Name Phone [phone] [facility] [phone] Your Updated Medication List  
  
   
This list is accurate as of: 12/26/17 11:54 AM.  Always use your most recent med list.  
  
  
  
  
 AMBIEN 10 mg tablet Generic drug:  zolpidem Take 12.5 mg by mouth nightly. apixaban 5 mg tablet Commonly known as:  [place] Take 1 Tab by mouth two (2) times a day. aspirin delayed-release 81 mg tablet Take  by mouth daily. benzonatate 200 mg capsule Commonly known as:  TESSALON Take 200 mg by mouth three (3) times daily as needed for Cough. BREO ELLIPTA 200-25 mcg/dose inhaler Generic drug:  fluticasone-vilanterol CO Q-10 10 mg Cap Generic drug:  coenzyme q10 Take 100 mg by mouth daily. furosemide 40 mg tablet Commonly known as:  LASIX Take 1 Tab by mouth daily. levothyroxine 50 mcg tablet Commonly known as:  SYNTHROID Take 50 mcg by mouth daily. LORazepam 1 mg tablet Commonly known as:  ATIVAN Take  by mouth every six (6) hours as needed. metoprolol tartrate 50 mg tablet Commonly known as:  LOPRESSOR Take 1 Tab by mouth two (2) times a day. nitroglycerin 0.4 mg SL tablet Commonly known as:  NITROSTAT  
1 Tab by SubLINGual route every five (5) minutes as needed for Chest Pain.  
  
 potassium chloride 20 mEq tablet Commonly known as:  K-DUR, KLOR-CON Take 1 Tab by mouth two (2) times a day. PriLOSEC OTC 20 mg tablet Generic drug:  omeprazole Take 20 mg by mouth as needed. ZOCOR 80 mg tablet Generic drug:  simvastatin Take 80 mg by mouth nightly. ZyrTEC 10 mg tablet Generic drug:  cetirizine Take  by mouth as needed. We Performed the Following AMB POC PFT COMPLETE W/BRONCHODILATOR [56606 CPT(R)] DIFFUSING CAPACITY [id number] CPT(R)] GAS DILUT/WASHOUT LUNG VOL W/WO DISTRIB VENT&VOL [28732 CPT(R)] Follow-up Instructions Return in about 4 weeks (around 1/23/2018). To-Do List   
 12/26/2017 Lab:  ALDOLASE   
  
 12/26/2017 Lab:  ANTINUCLEAR ANTIBODIES, IFA Around 12/26/2017 Imaging:  CT CHEST WO CONT   
  
 12/26/2017 Lab:  RHEUMATOID FACTOR, QL   
  
 12/26/2017 Lab:  SCLERODERMA (SCL-70) AB, IGG Patient Instructions Always call for symptoms such as unexplained increased shortness of breath Introducing [facility]! Marcella Fairchild introduces [employer] patient portal. Now you can access parts of your medical record, email your doctor's office, and request medication refills online. 1. In your internet browser, go to [email or web address] 2. Click on the First Time User? Click Here link in the Sign In box. You will see the New Member Sign Up page. 3. Enter your [employer] Access Code exactly as it appears below. You will not need to use this code after youve completed the sign-up process. If you do not sign up before the expiration date, you must request a new code. · [employer] Access Code: [email or web address] Expires: 3/5/2018 12:42 PM 
 
4. Enter the last four digits of your Social Security Number (xxxx) and Date of Birth (mm/dd/yyyy) as indicated and click Submit. You will be taken to the next sign-up page. 5. Create a [facility] ID. This will be your [facility] login ID and cannot be changed, so think of one that is secure and easy to remember. 6. Create a [facility] password. You can change your password at any time. 7. Enter your Password Reset Question and Answer. This can be used at a later time if you forget your password. 8. Enter your e-mail address. You will receive e-mail notification when new information is available in [address] 9. Click Sign Up. You can now view and download portions of your medical record. 10. Click the Download Summary menu link to download a portable copy of your medical information. If you have questions, please visit the Frequently Asked Questions section of the [facility] website. Remember, [facility] is NOT to be used for urgent needs. For medical emergencies, dial 911. Now available from your iPhone and Android! Please provide this summary of care documentation to your next provider. Your primary care clinician is listed as [facility]. If you have any questions after today's visit, please call [phone].

## 2017-12-26 NOTE — PROGRESS NOTES
[facility]  Pulmonary, Critical Care, and Sleep Medicine    Dear Darleen Claude,    Chief complaint:  Pulmonary fibrosis    HPI:  Monet Campo is 74years old and comes to the office today at your request concerning pulmonary fibrosis. The patient relates that approximately a month ago he developed a cough with fever and shaking chills as well as discolored mucus and one episode of hemoptysis. He relates he was treated with antibiotics but also had PND and ankle edema and eventually was treated with furosemide. Now he relates he continues to cough and he brings up clear mucus. He denies any hemoptysis chest pain and says his leg swelling is significantly better. He has no PND and his shortness of breath is as usual which she has had for some time but notes that he can do activities such as walk upstairs shop playing golf without significant dyspnea. Allergies   Allergen Reactions    Levofloxacin Swelling    Lipitor [Atorvastatin] Other (comments)     Joint pain     Current Outpatient Prescriptions   Medication Sig    aspirin delayed-release 81 mg tablet Take  by mouth daily.  benzonatate (TESSALON) 200 mg capsule Take 200 mg by mouth three (3) times daily as needed for Cough.  furosemide (LASIX) 40 mg tablet Take 1 Tab by mouth daily.  potassium chloride (K-DUR, KLOR-CON) 20 mEq tablet Take 1 Tab by mouth two (2) times a day.  BREO ELLIPTA 200-25 mcg/dose inhaler     metoprolol tartrate (LOPRESSOR) 50 mg tablet Take 1 Tab by mouth two (2) times a day.  apixaban (ELIQUIS) 5 mg tablet Take 1 Tab by mouth two (2) times a day.  levothyroxine (SYNTHROID) 50 mcg tablet Take 50 mcg by mouth daily.  LORazepam (ATIVAN) 1 mg tablet Take  by mouth every six (6) hours as needed.  simvastatin (ZOCOR) 80 mg tablet Take 80 mg by mouth nightly.  nitroglycerin (NITROSTAT) 0.4 mg SL tablet 1 Tab by SubLINGual route every five (5) minutes as needed for Chest Pain.     omeprazole (PRILOSEC OTC) 20 mg tablet Take 20 mg by mouth as needed.  cetirizine (ZYRTEC) 10 mg tablet Take  by mouth as needed.  zolpidem (AMBIEN) 10 mg tablet Take 12.5 mg by mouth nightly.  coenzyme q10 (CO Q-10) 10 mg Cap Take 100 mg by mouth daily. No current facility-administered medications for this visit. Past Medical History:   Diagnosis Date    Arrhythmia     Bronchitis 6/2013        CABG x 4 2001    CAD (coronary artery disease)     Cardiac cath 12/06/2012    RCA prev stent patent. LM severe but patent (supplies pLAD & CX). dLAD 60-85% (2.25 x 30-mm Resolute stent, resid 0%). SVG to % (known). SVG to dLAD 100% (known). Seq SVG to pLAD & RI patent. LVEDP 18.  EF 60%. Poss mild mid anterior hypk.  Cardiac nuclear imaging test, low risk 01/16/2017    Low risk. Sm basal inferior fixed defect, likely artifact. No ischemia. No WMA. EF 65%. Neg EKG on pharm stress test.  Unchanged from study of 1/28/15.     Chest pain     atypical    Chronic lung disease     Chronic obstructive pulmonary disease (HCC)     Coronary artery disease     Status post CABG x 4, with stenting of the native RCA in Dec 2001/ EF 60%    HAYWARD (dyspnea on exertion)     Elevated liver enzymes     mild    Hypercholesterolemia     Hypertension     Palpitations     Pulmonary fibrosis ([address]     PVC's (premature ventricular contractions)     Unspecified sleep apnea     wife stated pt's doctor aware  is unable to jazmin use of cpap    Vertigo     mild   He denies a history of diabetes kidney disease liver disease ulcers tuberculosis cancer asthma or emphysema  Past Surgical History:   Procedure Laterality Date    HAND/FINGER SURGERY UNLISTED  11-16-11    right    HX ANGIOPLASTY  12/2001, 2012    with stenting of native RCA    HX CHOLECYSTECTOMY  2010    HX CORONARY ARTERY BYPASS GRAFT  6/1996    x 4; Single SVG to distal LAD; sequential SVG to proximal LAD & intermediate marginal branch; single SVG to distal RCA    HX HEENT      CATARACT    HX HERNIA REPAIR  2007       Family history: Mother with heart disease       Social History: Smoked cigarettes for 2 years only 3 cigarettes a day and has not smoked for approximately 55 years    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   He does report 20 pound weight loss recently and a poor appetite but says that his appetite is improving slowly    Physical Exam:  Visit Vitals    /60 (BP 1 Location: Left arm, BP Patient Position: At rest)    Pulse 64    Temp 97.8 °F (36.6 °C) (Oral)    Resp 21    Ht 5' 6\" (1.676 m)    Wt 66.7 kg (147 lb)    SpO2 97%    BMI 23.73 kg/m2     Well-developed well-nourished  HEENT: pupils equal, reactive, sclera, non-icteric   Oropharynx tongue: normal   Neck: Supple   Lymph Nodes: Supra clavicular and cervical nodes, negative   Chest: Equal symmetrical expansion, no dullness, no wheezes, rales or rubs   Heart: Regular with occasional irregular beats,  without echo or murmur no carotid bruits  Abdomen: soft, non-tender no masses or organomegaly   Extremities: no cyanosis, clubbing, no edema no calf tenderness  Musculoskeletal: No acute joint inflammation or muscle wasting  Skin: No rash   Neurological: alert, oriented, moves all extremities      LABS:  O2 sat room air at rest 97%  CT of the chest windows from an abdomen CT in 2013 was noted on report to have increased interstitial markings  Chest x-ray 12/5/17 personally reviewed with evidence of previous heart surgery and bilateral interstitial infiltrates greater on the left including lingula and upper lobe infiltrate and lower lobe infiltrates bilaterally  Spirometry 12/26/17 with no evidence of airflow obstruction, lung volumes with a moderate restrictive defect and a reduced diffusion capacity of 37%  Impression:   Pulmonary fibrosis which is been present according to a CT imaging report since 2013 with a history stability however pulmonary function tests show significant lung dysfunction but it is not clear if this is in part related to patient technique as the lung volumes and diffusion suggests severe disease but the patient subjectively indicates good and stable lung function. The etiology of the pulmonary fibrosis is not clear IPF seems unlikely but can be evaluated better with a high resolution CT. There is no suggestion of connective tissue disease related pulmonary fibrosis but a immunological screen should be performed. There is also no exposure history from drugs or asbestos    Plan:  High-resolution CT of the chest  Immunological screen  Follow-up in 1 month and at that time try to make a determination of etiology and if the fibrosis is progressive if so a lung biopsy may be indicated if feasible    Thanks for allowing me to share in this patient's evaluation    Sincerely    Charlie Lou MD , [facility]    CC: Mary Rosario NP          R Ghislaine Coello MD    [address] [facility] [facility]  [place], [address] [address]     P: [phone]     F: [phone]

## 2017-12-26 NOTE — PROGRESS NOTES
Mr. Rodrigo Khan has a reminder for a \"due or due soon\" health maintenance. I have asked that he contact his primary care provider for follow-up on this health maintenance. Chief Complaint   Patient presents with    Pulmonary Fibrosis   1. Have you been to the ER, urgent care clinic since your last visit? Hospitalized since your last visit? No    2. Have you seen or consulted any other health care providers outside of the [address] [place] [address] since your last visit? Include any pap smears or colon screening.  No

## 2018-01-01 ENCOUNTER — HOSPITAL ENCOUNTER (EMERGENCY)
Age: 73
Discharge: HOME | End: 2018-08-15
Attending: EMERGENCY MEDICINE | Admitting: EMERGENCY MEDICINE
Payer: MEDICARE

## 2018-01-01 ENCOUNTER — HOSPITAL ENCOUNTER (OUTPATIENT)
Dept: GENERAL RADIOLOGY | Age: 73
Discharge: HOME | End: 2018-07-23
Payer: MEDICARE

## 2018-01-01 ENCOUNTER — HOSPITAL ENCOUNTER (OUTPATIENT)
Dept: LAB | Age: 73
Discharge: HOME | End: 2018-11-13
Payer: MEDICARE

## 2018-01-01 ENCOUNTER — HOSPITAL ENCOUNTER (OUTPATIENT)
Dept: CT IMAGING | Age: 73
Discharge: HOME | End: 2018-09-10
Attending: INTERNAL MEDICINE
Payer: MEDICARE

## 2018-01-01 ENCOUNTER — OFFICE VISIT (OUTPATIENT)
Dept: CARDIOLOGY CLINIC | Age: 73
End: 2018-01-01

## 2018-01-01 ENCOUNTER — HOSPITAL ENCOUNTER (OUTPATIENT)
Dept: CT IMAGING | Age: 73
Discharge: HOME | End: 2018-08-20
Attending: INTERNAL MEDICINE
Payer: MEDICARE

## 2018-01-01 ENCOUNTER — HOSPITAL ENCOUNTER (OUTPATIENT)
Dept: LAB | Age: 73
Discharge: HOME | End: 2018-10-19
Payer: MEDICARE

## 2018-01-01 ENCOUNTER — APPOINTMENT (OUTPATIENT)
Dept: GENERAL RADIOLOGY | Age: 73
End: 2018-01-01
Attending: EMERGENCY MEDICINE
Payer: MEDICARE

## 2018-01-01 VITALS
WEIGHT: 150 LBS | RESPIRATION RATE: 20 BRPM | BODY MASS INDEX: 24.11 KG/M2 | OXYGEN SATURATION: 93 % | HEART RATE: 121 BPM | SYSTOLIC BLOOD PRESSURE: 111 MMHG | DIASTOLIC BLOOD PRESSURE: 57 MMHG | HEIGHT: 66 IN | TEMPERATURE: 98.3 F

## 2018-01-01 VITALS
DIASTOLIC BLOOD PRESSURE: 72 MMHG | HEIGHT: 66 IN | BODY MASS INDEX: 24.27 KG/M2 | WEIGHT: 151 LBS | OXYGEN SATURATION: 93 % | HEART RATE: 86 BPM | SYSTOLIC BLOOD PRESSURE: 128 MMHG

## 2018-01-01 DIAGNOSIS — J84.10 PULMONARY FIBROSIS (HCC): ICD-10-CM

## 2018-01-01 DIAGNOSIS — J45.21 MILD INTERMITTENT ASTHMATIC BRONCHITIS WITH ACUTE EXACERBATION: Primary | ICD-10-CM

## 2018-01-01 DIAGNOSIS — I51.9 DIASTOLIC DYSFUNCTION, LEFT VENTRICLE: ICD-10-CM

## 2018-01-01 DIAGNOSIS — R06.09 DOE (DYSPNEA ON EXERTION): ICD-10-CM

## 2018-01-01 DIAGNOSIS — G47.33 OBSTRUCTIVE SLEEP APNEA SYNDROME: ICD-10-CM

## 2018-01-01 DIAGNOSIS — I25.10 ATHEROSCLEROSIS OF NATIVE CORONARY ARTERY OF NATIVE HEART WITHOUT ANGINA PECTORIS: Primary | ICD-10-CM

## 2018-01-01 DIAGNOSIS — J84.9 INTERSTITIAL LUNG DISEASE (HCC): ICD-10-CM

## 2018-01-01 DIAGNOSIS — I48.0 PAROXYSMAL ATRIAL FIBRILLATION (HCC): ICD-10-CM

## 2018-01-01 DIAGNOSIS — J84.9 INTERSTITIAL PULMONARY DISEASE, UNSPECIFIED (HCC): ICD-10-CM

## 2018-01-01 DIAGNOSIS — J41.0 SIMPLE CHRONIC BRONCHITIS (HCC): ICD-10-CM

## 2018-01-01 LAB
ALBUMIN SERPL-MCNC: 3.1 G/DL (ref 3.4–5)
ALBUMIN SERPL-MCNC: 3.3 G/DL (ref 3.4–5)
ALBUMIN/GLOB SERPL: 0.8 {RATIO} (ref 0.8–1.7)
ALBUMIN/GLOB SERPL: 0.8 {RATIO} (ref 0.8–1.7)
ALP SERPL-CCNC: 73 U/L (ref 45–117)
ALP SERPL-CCNC: 77 U/L (ref 45–117)
ALT SERPL-CCNC: 30 U/L (ref 16–61)
ALT SERPL-CCNC: 43 U/L (ref 16–61)
ANION GAP SERPL CALC-SCNC: 11 MMOL/L (ref 3–18)
ARTERIAL PATENCY WRIST A: ABNORMAL
ARTERIAL PATENCY WRIST A: ABNORMAL
AST SERPL-CCNC: 37 U/L (ref 15–37)
AST SERPL-CCNC: 40 U/L (ref 15–37)
ATRIAL RATE: 100 BPM
BASE DEFICIT BLD-SCNC: 4 MMOL/L
BASE EXCESS BLD CALC-SCNC: 0 MMOL/L
BASOPHILS # BLD: 0 K/UL (ref 0–0.1)
BASOPHILS NFR BLD: 0 % (ref 0–2)
BDY SITE: ABNORMAL
BDY SITE: ABNORMAL
BILIRUB DIRECT SERPL-MCNC: 0.1 MG/DL (ref 0–0.2)
BILIRUB DIRECT SERPL-MCNC: 0.1 MG/DL (ref 0–0.2)
BILIRUB SERPL-MCNC: 0.2 MG/DL (ref 0.2–1)
BILIRUB SERPL-MCNC: 0.4 MG/DL (ref 0.2–1)
BUN SERPL-MCNC: 22 MG/DL (ref 7–18)
BUN/CREAT SERPL: 16 (ref 12–20)
CALCIUM SERPL-MCNC: 8.6 MG/DL (ref 8.5–10.1)
CALCULATED P AXIS, ECG09: 29 DEGREES
CALCULATED R AXIS, ECG10: -26 DEGREES
CALCULATED T AXIS, ECG11: 47 DEGREES
CHLORIDE SERPL-SCNC: 99 MMOL/L (ref 100–108)
CO2 SERPL-SCNC: 25 MMOL/L (ref 21–32)
CREAT SERPL-MCNC: 1.36 MG/DL (ref 0.6–1.3)
DIAGNOSIS, 93000: NORMAL
DIFFERENTIAL METHOD BLD: ABNORMAL
EOSINOPHIL # BLD: 0 K/UL (ref 0–0.4)
EOSINOPHIL NFR BLD: 0 % (ref 0–5)
ERYTHROCYTE [DISTWIDTH] IN BLOOD BY AUTOMATED COUNT: 13.9 % (ref 11.6–14.5)
GAS FLOW.O2 O2 DELIVERY SYS: ABNORMAL L/MIN
GAS FLOW.O2 O2 DELIVERY SYS: ABNORMAL L/MIN
GLOBULIN SER CALC-MCNC: 3.8 G/DL (ref 2–4)
GLOBULIN SER CALC-MCNC: 4 G/DL (ref 2–4)
GLUCOSE SERPL-MCNC: 127 MG/DL (ref 74–99)
HCO3 BLD-SCNC: 19.3 MMOL/L (ref 22–26)
HCO3 BLD-SCNC: 22.8 MMOL/L (ref 22–26)
HCT VFR BLD AUTO: 40.7 % (ref 36–48)
HGB BLD-MCNC: 13.9 G/DL (ref 13–16)
LACTATE BLD-SCNC: 1.5 MMOL/L (ref 0.4–2)
LYMPHOCYTES # BLD: 0.8 K/UL (ref 0.9–3.6)
LYMPHOCYTES NFR BLD: 7 % (ref 21–52)
MCH RBC QN AUTO: 33 PG (ref 24–34)
MCHC RBC AUTO-ENTMCNC: 34.2 G/DL (ref 31–37)
MCV RBC AUTO: 96.7 FL (ref 74–97)
MONOCYTES # BLD: 0.6 K/UL (ref 0.05–1.2)
MONOCYTES NFR BLD: 6 % (ref 3–10)
NEUTS SEG # BLD: 9.6 K/UL (ref 1.8–8)
NEUTS SEG NFR BLD: 87 % (ref 40–73)
P-R INTERVAL, ECG05: 160 MS
PCO2 BLD: 29.1 MMHG (ref 35–45)
PCO2 BLD: 29.7 MMHG (ref 35–45)
PH BLD: 7.43 [PH] (ref 7.35–7.45)
PH BLD: 7.49 [PH] (ref 7.35–7.45)
PLATELET # BLD AUTO: 195 K/UL (ref 135–420)
PMV BLD AUTO: 9.2 FL (ref 9.2–11.8)
PO2 BLD: 68 MMHG (ref 80–100)
PO2 BLD: 69 MMHG (ref 80–100)
POTASSIUM SERPL-SCNC: 4.3 MMOL/L (ref 3.5–5.5)
PROT SERPL-MCNC: 6.9 G/DL (ref 6.4–8.2)
PROT SERPL-MCNC: 7.3 G/DL (ref 6.4–8.2)
Q-T INTERVAL, ECG07: 344 MS
QRS DURATION, ECG06: 114 MS
QTC CALCULATION (BEZET), ECG08: 443 MS
RBC # BLD AUTO: 4.21 M/UL (ref 4.7–5.5)
SAO2 % BLD: 94 % (ref 92–97)
SAO2 % BLD: 95 % (ref 92–97)
SERVICE CMNT-IMP: ABNORMAL
SERVICE CMNT-IMP: ABNORMAL
SODIUM SERPL-SCNC: 135 MMOL/L (ref 136–145)
SPECIMEN TYPE: ABNORMAL
SPECIMEN TYPE: ABNORMAL
TOTAL RESP. RATE, ITRR: 20
TOTAL RESP. RATE, ITRR: 22
VENTRICULAR RATE, ECG03: 100 BPM
WBC # BLD AUTO: 11.1 K/UL (ref 4.6–13.2)

## 2018-01-01 PROCEDURE — 83605 ASSAY OF LACTIC ACID: CPT

## 2018-01-01 PROCEDURE — 36415 COLL VENOUS BLD VENIPUNCTURE: CPT

## 2018-01-01 PROCEDURE — 36415 COLL VENOUS BLD VENIPUNCTURE: CPT | Performed by: INTERNAL MEDICINE

## 2018-01-01 PROCEDURE — 94640 AIRWAY INHALATION TREATMENT: CPT

## 2018-01-01 PROCEDURE — 99285 EMERGENCY DEPT VISIT HI MDM: CPT

## 2018-01-01 PROCEDURE — 93005 ELECTROCARDIOGRAM TRACING: CPT

## 2018-01-01 PROCEDURE — 82803 BLOOD GASES ANY COMBINATION: CPT

## 2018-01-01 PROCEDURE — 71045 X-RAY EXAM CHEST 1 VIEW: CPT

## 2018-01-01 PROCEDURE — 80048 BASIC METABOLIC PNL TOTAL CA: CPT | Performed by: EMERGENCY MEDICINE

## 2018-01-01 PROCEDURE — 36600 WITHDRAWAL OF ARTERIAL BLOOD: CPT

## 2018-01-01 PROCEDURE — 71250 CT THORAX DX C-: CPT

## 2018-01-01 PROCEDURE — 74011636637 HC RX REV CODE- 636/637: Performed by: EMERGENCY MEDICINE

## 2018-01-01 PROCEDURE — 71046 X-RAY EXAM CHEST 2 VIEWS: CPT

## 2018-01-01 PROCEDURE — 77030029684 HC NEB SM VOL KT MONA -A

## 2018-01-01 PROCEDURE — 85025 COMPLETE CBC W/AUTO DIFF WBC: CPT | Performed by: EMERGENCY MEDICINE

## 2018-01-01 PROCEDURE — 74011000250 HC RX REV CODE- 250: Performed by: EMERGENCY MEDICINE

## 2018-01-01 PROCEDURE — 80076 HEPATIC FUNCTION PANEL: CPT | Performed by: INTERNAL MEDICINE

## 2018-01-01 PROCEDURE — A9270 NON-COVERED ITEM OR SERVICE: HCPCS | Performed by: EMERGENCY MEDICINE

## 2018-01-01 PROCEDURE — 80076 HEPATIC FUNCTION PANEL: CPT

## 2018-01-01 RX ORDER — ALBUTEROL SULFATE 90 UG/1
2 AEROSOL, METERED RESPIRATORY (INHALATION)
Qty: 1 INHALER | Refills: 0 | Status: SHIPPED | OUTPATIENT
Start: 2018-01-01 | End: 2018-01-01 | Stop reason: SDUPTHER

## 2018-01-01 RX ORDER — IPRATROPIUM BROMIDE AND ALBUTEROL SULFATE 2.5; .5 MG/3ML; MG/3ML
3 SOLUTION RESPIRATORY (INHALATION) ONCE
Status: COMPLETED | OUTPATIENT
Start: 2018-01-01 | End: 2018-01-01

## 2018-01-01 RX ORDER — CEFUROXIME AXETIL 500 MG/1
TABLET ORAL 2 TIMES DAILY
COMMUNITY
End: 2019-01-01

## 2018-01-01 RX ORDER — AZITHROMYCIN 250 MG/1
250 TABLET, FILM COATED ORAL DAILY
COMMUNITY
End: 2019-01-01

## 2018-01-01 RX ORDER — ALBUTEROL SULFATE 0.83 MG/ML
2.5 SOLUTION RESPIRATORY (INHALATION)
Status: DISPENSED | OUTPATIENT
Start: 2018-01-01 | End: 2018-01-01

## 2018-01-01 RX ORDER — ALBUTEROL SULFATE 90 UG/1
2 AEROSOL, METERED RESPIRATORY (INHALATION)
Qty: 1 INHALER | Refills: 0 | Status: SHIPPED | OUTPATIENT
Start: 2018-01-01

## 2018-01-01 RX ORDER — APIXABAN 5 MG/1
TABLET, FILM COATED ORAL
Qty: 180 TAB | Refills: 3 | Status: SHIPPED | OUTPATIENT
Start: 2018-01-01

## 2018-01-01 RX ORDER — IPRATROPIUM BROMIDE 21 UG/1
2 SPRAY, METERED NASAL EVERY 12 HOURS
COMMUNITY
End: 2019-01-01

## 2018-01-01 RX ORDER — ALBUTEROL SULFATE 0.83 MG/ML
2.5 SOLUTION RESPIRATORY (INHALATION)
Status: COMPLETED | OUTPATIENT
Start: 2018-01-01 | End: 2018-01-01

## 2018-01-01 RX ORDER — PREDNISONE 20 MG/1
60 TABLET ORAL
Status: COMPLETED | OUTPATIENT
Start: 2018-01-01 | End: 2018-01-01

## 2018-01-01 RX ORDER — MONTELUKAST SODIUM 10 MG/1
10 TABLET ORAL DAILY
COMMUNITY

## 2018-01-01 RX ORDER — PREDNISONE 20 MG/1
20 TABLET ORAL DAILY
COMMUNITY
End: 2019-01-01

## 2018-01-01 RX ADMIN — PREDNISONE 60 MG: 20 TABLET ORAL at 13:23

## 2018-01-01 RX ADMIN — ALBUTEROL SULFATE 2.5 MG: 2.5 SOLUTION RESPIRATORY (INHALATION) at 14:45

## 2018-01-01 RX ADMIN — IPRATROPIUM BROMIDE AND ALBUTEROL SULFATE 3 ML: .5; 3 SOLUTION RESPIRATORY (INHALATION) at 13:23

## 2018-01-01 RX ADMIN — ALBUTEROL SULFATE 2.5 MG: 2.5 SOLUTION RESPIRATORY (INHALATION) at 13:23

## 2018-01-02 ENCOUNTER — HOSPITAL ENCOUNTER (OUTPATIENT)
Dept: LAB | Age: 73
Discharge: HOME | End: 2018-01-02
Payer: MEDICARE

## 2018-01-02 ENCOUNTER — HOSPITAL ENCOUNTER (OUTPATIENT)
Dept: CT IMAGING | Age: 73
Discharge: HOME | End: 2018-01-02
Attending: INTERNAL MEDICINE
Payer: MEDICARE

## 2018-01-02 DIAGNOSIS — J84.10 PULMONARY FIBROSIS (HCC): ICD-10-CM

## 2018-01-02 LAB — RHEUMATOID FACT SER QL LA: NEGATIVE

## 2018-01-02 PROCEDURE — 86235 NUCLEAR ANTIGEN ANTIBODY: CPT | Performed by: INTERNAL MEDICINE

## 2018-01-02 PROCEDURE — 36415 COLL VENOUS BLD VENIPUNCTURE: CPT | Performed by: INTERNAL MEDICINE

## 2018-01-02 PROCEDURE — 86038 ANTINUCLEAR ANTIBODIES: CPT | Performed by: INTERNAL MEDICINE

## 2018-01-02 PROCEDURE — 86430 RHEUMATOID FACTOR TEST QUAL: CPT | Performed by: INTERNAL MEDICINE

## 2018-01-02 PROCEDURE — 71250 CT THORAX DX C-: CPT

## 2018-01-02 PROCEDURE — 82085 ASSAY OF ALDOLASE: CPT | Performed by: INTERNAL MEDICINE

## 2018-01-03 LAB
ALDOLASE SERPL-CCNC: 4.6 U/L (ref 3.3–10.3)
ANA TITR SER IF: NEGATIVE {TITER}
ENA SCL70 AB SER-ACNC: <0.2 AI (ref 0–0.9)

## 2018-01-23 ENCOUNTER — OFFICE VISIT (OUTPATIENT)
Dept: PULMONOLOGY | Age: 73
End: 2018-01-23

## 2018-01-23 ENCOUNTER — OFFICE VISIT (OUTPATIENT)
Dept: CARDIOLOGY CLINIC | Age: 73
End: 2018-01-23

## 2018-01-23 VITALS
HEIGHT: 66 IN | TEMPERATURE: 98.1 F | WEIGHT: 151 LBS | HEART RATE: 72 BPM | SYSTOLIC BLOOD PRESSURE: 134 MMHG | DIASTOLIC BLOOD PRESSURE: 60 MMHG | RESPIRATION RATE: 20 BRPM | OXYGEN SATURATION: 95 % | BODY MASS INDEX: 24.27 KG/M2

## 2018-01-23 VITALS
HEIGHT: 66 IN | SYSTOLIC BLOOD PRESSURE: 130 MMHG | BODY MASS INDEX: 24.53 KG/M2 | OXYGEN SATURATION: 98 % | WEIGHT: 152.6 LBS | DIASTOLIC BLOOD PRESSURE: 68 MMHG | HEART RATE: 86 BPM

## 2018-01-23 DIAGNOSIS — I25.10 ATHEROSCLEROSIS OF NATIVE CORONARY ARTERY OF NATIVE HEART WITHOUT ANGINA PECTORIS: ICD-10-CM

## 2018-01-23 DIAGNOSIS — J84.10 PULMONARY FIBROSIS (HCC): ICD-10-CM

## 2018-01-23 DIAGNOSIS — G47.33 SLEEP APNEA, OBSTRUCTIVE: ICD-10-CM

## 2018-01-23 DIAGNOSIS — R06.09 DOE (DYSPNEA ON EXERTION): ICD-10-CM

## 2018-01-23 DIAGNOSIS — I51.9 DIASTOLIC DYSFUNCTION, LEFT VENTRICLE: ICD-10-CM

## 2018-01-23 DIAGNOSIS — I48.0 PAROXYSMAL ATRIAL FIBRILLATION (HCC): Primary | ICD-10-CM

## 2018-01-23 DIAGNOSIS — J84.10 PULMONARY FIBROSIS (HCC): Primary | ICD-10-CM

## 2018-01-23 PROBLEM — I48.91 AF (ATRIAL FIBRILLATION) (HCC): Status: ACTIVE | Noted: 2018-01-23

## 2018-01-23 RX ORDER — ALBUTEROL SULFATE 90 UG/1
2 AEROSOL, METERED RESPIRATORY (INHALATION)
COMMUNITY
End: 2018-01-01

## 2018-01-23 NOTE — PROGRESS NOTES
HISTORY OF PRESENT ILLNESS  Yuval Castro is a 74 y.o. male. HPI  He has been feeling quite well. He feels as if he is back to baseline. He denies dyspnea, orthopnea, PND. He has had no chest pain, palpitations whatsoever. He denies any symptoms of TIA or amaurosis fugax. He was seen on 12/5/17 at which time he was found to have new onset atrial fibrillation and evidence of heart failure with elevated proBNP level. He was treated with Lasix and has been treated with rate controlling medication and anticoagulation with Eliquis. He had repeat echocardiogram on 12/22/17 which demonstrated lower limit of normal LV function with EF in the 50-55% range and evidence grade 1 diastolic dysfunction parameters. The right ventricle was mildly dilated and the systolic function was mildly reduced. The left atrial dimension was normal with a volume index of 25 mL/m2 and the right atrial dimension was at the upper limit of normal. There was doppler evidence of moderate regurgitation of the aortic valve but no aortic stenosis. There is no mitral valvular pathology. He had a Holter monitor on 12/12/17 which demonstrated sinus rhythm. There are frequent PVCs and 9 short runs of nonsustained SVT, the longest 4-5 beats. He has a history of CABG X4 in the latter part of June 1996, which consisted of:    1. Single SVG to the distal LAD. 2. Sequential SVG to the proximal LAD and intermediate marginal branch. 3. Single SVG to the distal RCA. Because of an abnormal thallium myocardial scanning, he underwent a cardiac catheterization on December 4, 2001, which demonstrated total occlusion of the vein graft to the distal LAD but patent sequential SVG to the proximal LAD and intermediate marginal branch. The SVG to the distal RCA was totally occluded; however, the RCA was still open with a 90% stenosis. It was successfully angioplastied and stented. His LV function was mildly diminished with an EF in the range of 50% or less.    Because of the palpitations and skipping, he underwent a 24-hour Holter monitor on February 18, 2004, which demonstrated some frequent atrial ectopies and intermittent short runs of atrial tachycardia and fibrillation and rare PVCs. He was advised to take a beta blocker, but he has been very reluctant to take a beta blocker because of fear of sexual dysfunction. He has had no sustained tachycardia thus far. Because of recurrent chest pain, he underwent cardiac catheterization on March 7, 2005, which demonstrated:    1. 60% stenosis of the left main trunk.    2. Total occlusion of the LAD in the proximal segment.    3. Severe, diffuse disease of the circumflex artery, with total occlusion.    4. 80% stenosis of the ostium of the ramus intermedius.    5. Patent dominant right coronary artery, with 10% stenosis.    6. Patent SVG to the proximal LAD, but total occlusion of the vein graft to the distal LAD.    7. Total occlusion of the vein graft to the distal RCA.    8. Normal left ventricular systolic function, with EF in the 60% range.    It was felt that he could be best treated medically. He underwent stress nuclear cardiac imaging on May 7, 2007, at which time he was able to exercise 9-minutes, with no chest pain or EKG changes. His nuclear imaging demonstrated mild scarring and ischemia in the inferior wall. The ejection fraction was estimated in the 72% range. He had repeat stress nuclear cardiac imaging on August 20, 2009 at which time he was able to exercise 9 minutes and 35 seconds with no chest pain or EKG changes. Perfusion imaging was essentially unchanged in comparison to the one from May 7, 2007.    He had repeat stress test as a Lexiscan Cardiolite myocardial perfusion imaging on 8/2/11 which was essentially unchanged and negative.    His follow up stress nuclear cardiac imaging on 7/3/2012 demonstrated normal perfusion with no evidence of scaring or ischemia.  The ejection fraction was estimated in the 70% range.    Because of the continued dyspnea on exertion, he had repeat cardiac catheterization on 12/6/12 to rule out any atypical presentation of angina. His coronary angiogram demonstrated new 60-85% diffuse narrowing in the native LAD at the insertion of the vein graft to the proximal LAD. The remainder of the coronary anatomy was unchanged and there was no evidence of restenosis of the stented right coronary artery. LVEDP was up to 18 mmHg. The left ventricular ejection fraction was in the 60% range.    The native LAD was subsequently stented with the 2.25 x 30 mm Resolute stent successfully.    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 His CT scan of the chest demonstrated evidence of COPD with emphysema in the upper lobes and mosaic attenuation which is widespread and likely secondary to small airway disease. There was also some bronchial thickening and mild cylindrical bronchiectasis. He was subsequently referred to a pulmonologist who advised him that he has a chronic bronchitis. He was started on inhalers with very little help.    He underwent stress nuclear cardiac imaging for follow up on 1/28/15, which demonstrated normal perfusion with no evidence of scarring or ischemia. The ejection fraction was in the 70% range.   He underwent followup stress nuclear cardiac imaging on 01/16/2017, which demonstrated a small area of fixed perfusion defect in the basal inferior wall most likely related to diaphragmatic attenuation with no other perfusion defect.  Ejection fraction was in the range of 65%. Review of Systems   Constitutional: Negative for malaise/fatigue and weight loss. HENT: Negative for hearing loss. Eyes: Negative for blurred vision and double vision. Respiratory: Positive for shortness of breath.     Cardiovascular: Negative for chest pain, palpitations, orthopnea, claudication, leg swelling and PND. Gastrointestinal: Negative for blood in stool, heartburn and melena. Genitourinary: Positive for frequency. Negative for dysuria, hematuria and urgency. Musculoskeletal: Negative for back pain and joint pain. Skin: Negative for itching and rash. Neurological: Negative for dizziness, loss of consciousness and weakness. Psychiatric/Behavioral: Negative for depression and memory loss. Physical Exam   Constitutional: He is oriented to person, place, and time. He appears well-developed and well-nourished. HENT:   Head: Normocephalic and atraumatic. Eyes: Conjunctivae are normal. Pupils are equal, round, and reactive to light. Neck: Normal range of motion. Neck supple. No JVD present. Cardiovascular: Normal rate, regular rhythm, S1 normal and S2 normal.   No extrasystoles are present. PMI is not displaced. Exam reveals no gallop and no friction rub. No murmur heard. Pulses:       Carotid pulses are 3+ on the right side, and 3+ on the left side. Pulmonary/Chest: Effort normal. He has rales. Dry rales   Abdominal: Soft. There is no tenderness. Musculoskeletal: He exhibits no edema. Neurological: He is alert and oriented to person, place, and time. No cranial nerve deficit. Skin: Skin is warm and dry. Psychiatric: He has a normal mood and affect. His behavior is normal.     Visit Vitals    /68    Pulse 86    Ht 5' 6\" (1.676 m)    Wt 69.2 kg (152 lb 9.6 oz)    SpO2 98%    BMI 24.63 kg/m2       Past Medical History:   Diagnosis Date    Arrhythmia     Bronchitis 6/2013        CABG x 4 2001    CAD (coronary artery disease)     Cardiac cath 12/06/2012    RCA prev stent patent. LM severe but patent (supplies pLAD & CX). dLAD 60-85% (2.25 x 30-mm Resolute stent, resid 0%). SVG to % (known). SVG to dLAD 100% (known). Seq SVG to pLAD & RI patent. LVEDP 18.  EF 60%. Poss mild mid anterior hypk.       Cardiac nuclear imaging test, low risk 01/16/2017    Low risk. Sm basal inferior fixed defect, likely artifact. No ischemia. No WMA. EF 65%. Neg EKG on pharm stress test.  Unchanged from study of 1/28/15.  Chest pain     atypical    Chronic lung disease     Chronic obstructive pulmonary disease (HCC)     Coronary artery disease     Status post CABG x 4, with stenting of the native RCA in Dec 2001/ EF 60%    HAYWARD (dyspnea on exertion)     Elevated liver enzymes     mild    Hypercholesterolemia     Hypertension     Palpitations     Pulmonary fibrosis ([address]     PVC's (premature ventricular contractions)     Unspecified sleep apnea     wife stated pt's doctor aware  is unable to jazmin use of cpap    Vertigo     mild       Social History     Social History    Marital status:      Spouse name: N/A    Number of children: N/A    Years of education: N/A     Occupational History    Not on file.      Social History Main Topics    Smoking status: Never Smoker    Smokeless tobacco: Never Used    Alcohol use Yes      Comment: 10 drinks per week    Drug use: No    Sexual activity: Not on file     Other Topics Concern    Not on file     Social History Narrative       Family History   Problem Relation Age of Onset    Hypertension Brother     Arthritis-osteo Brother     Cancer Mother      bone and breast cancer       Past Surgical History:   Procedure Laterality Date    HAND/FINGER SURGERY UNLISTED  11-16-11    right   Jessica Ebbs ANGIOPLASTY  12/2001, 2012    with stenting of native RCA    HX CHOLECYSTECTOMY  2010    HX CORONARY ARTERY BYPASS GRAFT  6/1996    x 4; Single SVG to distal LAD; sequential SVG to proximal LAD & intermediate marginal branch; single SVG to distal RCA    HX HEENT      CATARACT    HX HERNIA REPAIR  2007       Current Outpatient Prescriptions   Medication Sig Dispense Refill    BREO ELLIPTA 200-25 mcg/dose inhaler       metoprolol tartrate (LOPRESSOR) 50 mg tablet Take 1 Tab by mouth two (2) times a day. 60 Tab 6    apixaban (ELIQUIS) 5 mg tablet Take 1 Tab by mouth two (2) times a day. 60 Tab 6    levothyroxine (SYNTHROID) 50 mcg tablet Take 50 mcg by mouth daily.  LORazepam (ATIVAN) 1 mg tablet Take  by mouth every six (6) hours as needed.  simvastatin (ZOCOR) 80 mg tablet Take 80 mg by mouth nightly.  nitroglycerin (NITROSTAT) 0.4 mg SL tablet 1 Tab by SubLINGual route every five (5) minutes as needed for Chest Pain. 25 Tab 3    omeprazole (PRILOSEC OTC) 20 mg tablet Take 20 mg by mouth as needed.  zolpidem (AMBIEN) 10 mg tablet Take 12.5 mg by mouth nightly.  coenzyme q10 (CO Q-10) 10 mg Cap Take 100 mg by mouth daily.  aspirin delayed-release 81 mg tablet Take  by mouth daily.  benzonatate (TESSALON) 200 mg capsule Take 200 mg by mouth three (3) times daily as needed for Cough.  cetirizine (ZYRTEC) 10 mg tablet Take  by mouth as needed. EKG: normal sinus rhythm, RBBB, left axis deviation, converted to NSR since 12/05/17. ASSESSMENT and PLAN  Encounter Diagnoses   Name Primary?  Paroxysmal atrial fibrillation (HCC) Yes    Atherosclerosis of native coronary artery of native heart without angina pectoris,status post CABG X4 in June 1996, with stenting of the native RCA in December 2001/ EF 60%; s/p stenting in the LAD 12     Pulmonary fibrosis ([address]     Sleep apnea, obstructive     HAYWARD (dyspnea on exertion)     Diastolic dysfunction, left ventricle    He was converted to normal sinus rhythm. His acute left ventricular diastolic heart failure has now completely resolved and he has remained asymptomatic off diuretics. It appears that the atrial fibrillation most likely triggered  left ventricular diastolic failure. He does have a predisposition to atrial fibrillation that includes pulmonary fibrosis, sleep apnea untreated, age factor. The most recent triggering of his atrial fibrillation was most likely pneumonia.  He came with his wife and they were advised that it would be high rate recurrence of the atrial fibrillation in the future and he will have to remain on Eliquis until we could be convinced that he does not have any recurrent atrial fibrillation. He was given strong advice to go back to the sleep apnea clinic and try to find a CPAP mask that could fit him well. I spent over 40 minutes with the patient and his wife face-to-face, 50% of which was spent in counseling and discussion. There appears to be no evidence of ischemic event at this time.

## 2018-01-23 NOTE — PROGRESS NOTES
[facility]  Pulmonary, Critical Care, and Sleep Medicine      Chief complaint:  Pulmonary fibrosis    HPI:    Seema Handy    is 74years old returns the office today for concerning pulmonary fibrosis evaluation and relates she has no significant shortness of breath except with exertion like carrying heavy limits. He denies chest pain leg pain or swelling but still has minimal cough. Allergies   Allergen Reactions    Levofloxacin Swelling    Lipitor [Atorvastatin] Other (comments)     Joint pain     Current Outpatient Prescriptions   Medication Sig    albuterol (PROVENTIL HFA, VENTOLIN HFA, PROAIR HFA) 90 mcg/actuation inhaler Take 2 Puffs by inhalation every four (4) hours as needed for Wheezing or Shortness of Breath. Ventolin HFA    aspirin delayed-release 81 mg tablet Take  by mouth daily.  benzonatate (TESSALON) 200 mg capsule Take 200 mg by mouth three (3) times daily as needed for Cough.  BREO ELLIPTA 200-25 mcg/dose inhaler Take 1 Puff by inhalation daily.  metoprolol tartrate (LOPRESSOR) 50 mg tablet Take 1 Tab by mouth two (2) times a day.  apixaban (ELIQUIS) 5 mg tablet Take 1 Tab by mouth two (2) times a day.  levothyroxine (SYNTHROID) 50 mcg tablet Take 50 mcg by mouth daily.  LORazepam (ATIVAN) 1 mg tablet Take  by mouth every six (6) hours as needed.  simvastatin (ZOCOR) 80 mg tablet Take 80 mg by mouth nightly.  nitroglycerin (NITROSTAT) 0.4 mg SL tablet 1 Tab by SubLINGual route every five (5) minutes as needed for Chest Pain.  omeprazole (PRILOSEC OTC) 20 mg tablet Take 20 mg by mouth as needed.  cetirizine (ZYRTEC) 10 mg tablet Take  by mouth as needed.  zolpidem (AMBIEN) 10 mg tablet Take 12.5 mg by mouth nightly.  coenzyme q10 (CO Q-10) 10 mg Cap Take 100 mg by mouth daily. No current facility-administered medications for this visit.       Past Medical History:   Diagnosis Date    Arrhythmia     Bronchitis 6/2013     CABG x 4 2001    CAD (coronary artery disease)     Cardiac cath 12/06/2012    RCA prev stent patent. LM severe but patent (supplies pLAD & CX). dLAD 60-85% (2.25 x 30-mm Resolute stent, resid 0%). SVG to % (known). SVG to dLAD 100% (known). Seq SVG to pLAD & RI patent. LVEDP 18.  EF 60%. Poss mild mid anterior hypk.  Cardiac nuclear imaging test, low risk 01/16/2017    Low risk. Sm basal inferior fixed defect, likely artifact. No ischemia. No WMA. EF 65%. Neg EKG on pharm stress test.  Unchanged from study of 1/28/15.  Chest pain     atypical    Chronic lung disease     Chronic obstructive pulmonary disease (HCC)     Coronary artery disease     Status post CABG x 4, with stenting of the native RCA in Dec 2001/ EF 60%    HAYWARD (dyspnea on exertion)     Elevated liver enzymes     mild    Hypercholesterolemia     Hypertension     Palpitations     Pulmonary fibrosis ([address]     PVC's (premature ventricular contractions)     Unspecified sleep apnea     wife stated pt's doctor aware  is unable to jazmin use of cpap    Vertigo     mild     Past Surgical History:   Procedure Laterality Date    HAND/FINGER SURGERY UNLISTED  11-16-11    right    HX ANGIOPLASTY  12/2001, 2012    with stenting of native RCA    HX CHOLECYSTECTOMY  2010    HX CORONARY ARTERY BYPASS GRAFT  6/1996    x 4; Single SVG to distal LAD; sequential SVG to proximal LAD & intermediate marginal branch; single SVG to distal RCA    HX HEENT      CATARACT    HX HERNIA REPAIR  2007     Social History     Social History    Marital status:      Spouse name: N/A    Number of children: N/A    Years of education: N/A     Occupational History    Not on file.      Social History Main Topics    Smoking status: Never Smoker    Smokeless tobacco: Never Used    Alcohol use Yes      Comment: 10 drinks per week    Drug use: No    Sexual activity: Not on file     Other Topics Concern    Not on file     Social History Narrative     Family History   Problem Relation Age of Onset    Hypertension Brother     Arthritis-osteo Brother     Cancer Mother      bone and breast cancer       Review of systems:  He denies fever chills poor appetite or weight loss    Physical Exam:  Visit Vitals    /60 (BP 1 Location: Left arm, BP Patient Position: Sitting)    Pulse 72    Temp 98.1 °F (36.7 °C)    Resp 20    Ht 5' 6\" (1.676 m)    Wt 68.5 kg (151 lb)    SpO2 95%    BMI 24.37 kg/m2       Well-developed well-nourished  HEENT: WNL  Lymph node exam: Supraclavicular cervical lymph nodes negative  Chest: Equal symmetrical expansion no dullness no wheezes  Rubs inspiratory rales at bases right greater than left posterior  Heart: Regular rhythm no gallop no murmur no JVD no peripheral edema  Extremities: No cyanosis or clubbing or calf tenderness  Neurological: Alert    Labs:  Immunological screen: Negative VIDHYA titer SCL 70 rheumatoid factor aldolase level   O2 sat room air at rest 95%  CT of the chest 1/2/18 personally reviewed: Extensive pulmonary fibrosis bilaterally few areas of groundglass appearance and no honeycombing    Impression:   Pulmonary fibrosis by CT imaging does not appear to be IPF but it is not clear what is the etiology also it does not appear to be rapidly progressive      Plan:     Consideration for transbronchial lung biopsy in 3 months after patient is cleared by cardiology and I spoke with Diego Nelson today who felt the patient needed to be treated for at least 3 more months with Eliquis for atrial fibrillation before discontinuing the medication  Follow-up in 3 months    Josué Lyons MD , [facility]    CC: Espinoza Reyez NP     [address] [facility]  [place], [address] [address]     P: [phone]     F: [phone]

## 2018-01-23 NOTE — PROGRESS NOTES
1. Have you been to the ER, urgent care clinic since your last visit? Hospitalized since your last visit? No    2. Have you seen or consulted any other health care providers outside of the [address] [place] [address] since your last visit? Include any pap smears or colon screening.  No

## 2018-01-23 NOTE — MR AVS SNAPSHOT
[address] [id number] [address] [address] [phone] Patient: Yue Hall MRN: [id number] ZIE:[id number] Visit Information Date & Time Provider Department Dept. Phone Encounter #  
 1/23/2018  9:00 AM Demian Ventura MD [facility] [id number] Your Appointments 1/23/2018 10:30 AM  
Follow Up with Rogers Odonnell MD  
[address] ([facility]) Appt Note: 4 wk fu from 12-26-17  
 [address] [place] [address], [address] [place] [address]  
[phone]  
  
   
 [address] [place] [address], [address] [place] [address],[address] [place] [address] Upcoming Health Maintenance Date Due FOBT Q 1 YEAR AGE 50-75 1/26/1993 ZOSTER VACCINE AGE 60> 11/26/2002 GLAUCOMA SCREENING Q2Y 1/26/2008 Pneumococcal 65+ Low/Medium Risk (1 of 2 - PCV13) 1/26/2008 MEDICARE YEARLY EXAM 1/26/2008 DTaP/Tdap/Td series (2 - Td) 10/6/2027 Allergies as of 1/23/2018  Review Complete On: 1/23/2018 By: Demian Ventura MD  
  
 Severity Noted Reaction Type Reactions Levofloxacin Medium 12/26/2017   Side Effect Swelling Lipitor [Atorvastatin]  03/06/2012    Other (comments) Joint pain Current Immunizations  Never Reviewed Name Date Tdap 10/6/2017  3:17 PM  
  
 Not reviewed this visit You Were Diagnosed With   
  
 Codes Comments Atherosclerosis of native coronary artery of native heart without angina pectoris    -  Primary ICD-10-CM: I25.10 ICD-9-CM: 414.01 Vitals BP Pulse Height(growth percentile) Weight(growth percentile) SpO2 BMI  
 130/68 86 5' 6\" (1.676 m) 152 lb 9.6 oz (69.2 kg) 98% 24.63 kg/m2 Smoking Status Never Smoker BMI and BSA Data Body Mass Index Body Surface Area  
 24.63 kg/m 2 1.8 m 2 Preferred Pharmacy Pharmacy Name Phone  [facility] [phone] Your Updated Medication List  
  
   
This list is accurate as of: 1/23/18  9:58 AM.  Always use your most recent med list.  
  
  
  
  
 AMBIEN 10 mg tablet Generic drug:  zolpidem Take 12.5 mg by mouth nightly. apixaban 5 mg tablet Commonly known as:  Melva Bussing Take 1 Tab by mouth two (2) times a day. aspirin delayed-release 81 mg tablet Take  by mouth daily. benzonatate 200 mg capsule Commonly known as:  TESSALON Take 200 mg by mouth three (3) times daily as needed for Cough. BREO ELLIPTA 200-25 mcg/dose inhaler Generic drug:  fluticasone-vilanterol CO Q-10 10 mg Cap Generic drug:  coenzyme q10 Take 100 mg by mouth daily. levothyroxine 50 mcg tablet Commonly known as:  SYNTHROID Take 50 mcg by mouth daily. LORazepam 1 mg tablet Commonly known as:  ATIVAN Take  by mouth every six (6) hours as needed. metoprolol tartrate 50 mg tablet Commonly known as:  LOPRESSOR Take 1 Tab by mouth two (2) times a day. nitroglycerin 0.4 mg SL tablet Commonly known as:  NITROSTAT  
1 Tab by SubLINGual route every five (5) minutes as needed for Chest Pain. PriLOSEC OTC 20 mg tablet Generic drug:  omeprazole Take 20 mg by mouth as needed. ZOCOR 80 mg tablet Generic drug:  simvastatin Take 80 mg by mouth nightly. ZyrTEC 10 mg tablet Generic drug:  cetirizine Take  by mouth as needed. We Performed the Following AMB POC EKG ROUTINE W/ 12 LEADS, INTER & REP [41855 CPT(R)] Introducing [facility]! Nicky Villegas introduces [employer] patient portal. Now you can access parts of your medical record, email your doctor's office, and request medication refills online. 1. In your internet browser, go to [email or web address] 2. Click on the First Time User? Click Here link in the Sign In box. You will see the New Member Sign Up page. 3. Enter your [employer] Access Code exactly as it appears below. You will not need to use this code after youve completed the sign-up process. If you do not sign up before the expiration date, you must request a new code. · [employer] Access Code: [email or web address] Expires: 3/5/2018 12:42 PM 
 
4. Enter the last four digits of your Social Security Number (xxxx) and Date of Birth (mm/dd/yyyy) as indicated and click Submit. You will be taken to the next sign-up page. 5. Create a [employer] ID. This will be your [employer] login ID and cannot be changed, so think of one that is secure and easy to remember. 6. Create a [employer] password. You can change your password at any time. 7. Enter your Password Reset Question and Answer. This can be used at a later time if you forget your password. 8. Enter your e-mail address. You will receive e-mail notification when new information is available in [address] 9. Click Sign Up. You can now view and download portions of your medical record. 10. Click the Download Summary menu link to download a portable copy of your medical information. If you have questions, please visit the Frequently Asked Questions section of the [employer] website. Remember, [employer] is NOT to be used for urgent needs. For medical emergencies, dial 911. Now available from your iPhone and Android! Please provide this summary of care documentation to your next provider. Your primary care clinician is listed as [facility]. If you have any questions after today's visit, please call [phone].

## 2018-01-23 NOTE — PROGRESS NOTES
The pt. States he has a cough; prod. Of hill colored sputum at times. No recent visits to the ED/Urgent Care. He saw his PCP two weeks ago.

## 2018-01-23 NOTE — MR AVS SNAPSHOT
[address] [facility], [address] [place] [address] 
[phone] Patient: Jannelle Nissen MRN: [id number] HHE:[id number][birth year]47[birth year] Visit Information Date & Time Provider Department Dept. Phone Encounter #  
 1/23/2018 10:30 AM MD Naida Rojas [facility] Renato Kim [id number] Follow-up Instructions Return in about 8 weeks (around 3/20/2018). Your Appointments 4/24/2018  9:00 AM  
Follow Up with Gary Mays MD  
[facility] Appt Note: 3 month f/u  
 [facility] [address] [address] [address] [phone] [address] [place] [address] [address] Upcoming Health Maintenance Date Due FOBT Q 1 YEAR AGE 50-75 1/26/1993 ZOSTER VACCINE AGE 60> 11/26/2002 GLAUCOMA SCREENING Q2Y 1/26/2008 Pneumococcal 65+ Low/Medium Risk (1 of 2 - PCV13) 1/26/2008 MEDICARE YEARLY EXAM 1/26/2008 DTaP/Tdap/Td series (2 - Td) 10/6/2027 Allergies as of 1/23/2018  Review Complete On: 1/23/2018 By: Cliff Blank LPN Severity Noted Reaction Type Reactions Levofloxacin Medium 12/26/2017   Side Effect Swelling Lipitor [Atorvastatin]  03/06/2012    Other (comments) Joint pain Current Immunizations  Never Reviewed Name Date Tdap 10/6/2017  3:17 PM  
  
 Not reviewed this visit You Were Diagnosed With   
  
 Codes Comments [facility]monary [facility] ([facility]    -  Primary ICD-10-CM: J84.10 ICD-9-CM: 065 Vitals BP Pulse Temp Resp Height(growth percentile) Weight(growth percentile) 134/60 (BP 1 Location: Left arm, BP Patient Position: Sitting) 72 98.1 °F (36.7 °C) 20 5' 6\" (1.676 m) 151 lb (68.5 kg) SpO2 BMI Smoking Status 95% 24.37 kg/m2 Never Smoker BMI and BSA Data Body Mass Index Body Surface Area  
 24.37 kg/m 2 1.79 m 2 Preferred Pharmacy Pharmacy Name Phone [facility] [address] [facility] [phone] Your Updated Medication List  
  
   
This list is accurate as of: 1/23/18 11:11 AM.  Always use your most recent med list.  
  
  
  
  
 albuterol 90 mcg/actuation inhaler Commonly known as:  PROVENTIL HFA, VENTOLIN HFA, PROAIR HFA Take 2 Puffs by inhalation every four (4) hours as needed for Wheezing or Shortness of Breath. Ventolin HFA AMBIEN 10 mg tablet Generic drug:  zolpidem Take 12.5 mg by mouth nightly. apixaban 5 mg tablet Commonly known as:  [place] Take 1 Tab by mouth two (2) times a day. aspirin delayed-release 81 mg tablet Take  by mouth daily. benzonatate 200 mg capsule Commonly known as:  TESSALON Take 200 mg by mouth three (3) times daily as needed for Cough. BREO ELLIPTA 200-25 mcg/dose inhaler Generic drug:  fluticasone-vilanterol Take 1 Puff by inhalation daily. CO Q-10 10 mg Cap Generic drug:  coenzyme q10 Take 100 mg by mouth daily. levothyroxine 50 mcg tablet Commonly known as:  SYNTHROID Take 50 mcg by mouth daily. LORazepam 1 mg tablet Commonly known as:  ATIVAN Take  by mouth every six (6) hours as needed. metoprolol tartrate 50 mg tablet Commonly known as:  LOPRESSOR Take 1 Tab by mouth two (2) times a day. nitroglycerin 0.4 mg SL tablet Commonly known as:  NITROSTAT  
1 Tab by SubLINGual route every five (5) minutes as needed for Chest Pain. PriLOSEC OTC 20 mg tablet Generic drug:  omeprazole Take 20 mg by mouth as needed. ZOCOR 80 mg tablet Generic drug:  simvastatin Take 80 mg by mouth nightly. ZyrTEC 10 mg tablet Generic drug:  cetirizine Take  by mouth as needed. Follow-up Instructions Return in about 8 weeks (around 3/20/2018). Patient Instructions Call for symptoms such as shortness of breath Introducing [facility] & HEALTH SERVICES! [facility] introduces ComponentLab patient portal. Now you can access parts of your medical record, email your doctor's office, and request medication refills online. 1. In your internet browser, go to [email or web address] 2. Click on the First Time User? Click Here link in the Sign In box. You will see the New Member Sign Up page. 3. Enter your ComponentLab Access Code exactly as it appears below. You will not need to use this code after youve completed the sign-up process. If you do not sign up before the expiration date, you must request a new code. · ComponentLab Access Code: [email or web address] Expires: 3/5/2018 12:42 PM 
 
4. Enter the last four digits of your Social Security Number (xxxx) and Date of Birth (mm/dd/yyyy) as indicated and click Submit. You will be taken to the next sign-up page. 5. Create a ComponentLab ID. This will be your ComponentLab login ID and cannot be changed, so think of one that is secure and easy to remember. 6. Create a ComponentLab password. You can change your password at any time. 7. Enter your Password Reset Question and Answer. This can be used at a later time if you forget your password. 8. Enter your e-mail address. You will receive e-mail notification when new information is available in [address] 9. Click Sign Up. You can now view and download portions of your medical record. 10. Click the Download Summary menu link to download a portable copy of your medical information. If you have questions, please visit the Frequently Asked Questions section of the ComponentLab website. Remember, ComponentLab is NOT to be used for urgent needs. For medical emergencies, dial 911. Now available from your iPhone and Android! Please provide this summary of care documentation to your next provider. Your primary care clinician is listed as [facility]. If you have any questions after today's visit, please call [phone].

## 2018-02-28 ENCOUNTER — HOSPITAL ENCOUNTER (INPATIENT)
Age: 73
LOS: 1 days | Discharge: HOME | DRG: 291 | End: 2018-03-02
Attending: EMERGENCY MEDICINE | Admitting: INTERNAL MEDICINE
Payer: MEDICARE

## 2018-02-28 ENCOUNTER — APPOINTMENT (OUTPATIENT)
Dept: GENERAL RADIOLOGY | Age: 73
DRG: 291 | End: 2018-02-28
Attending: EMERGENCY MEDICINE
Payer: MEDICARE

## 2018-02-28 LAB
ANION GAP SERPL CALC-SCNC: 8 MMOL/L (ref 3–18)
APPEARANCE UR: CLEAR
BASOPHILS # BLD: 0 K/UL (ref 0–0.1)
BASOPHILS NFR BLD: 0 % (ref 0–2)
BILIRUB UR QL: NEGATIVE
BNP SERPL-MCNC: 387 PG/ML (ref 0–1800)
BUN SERPL-MCNC: 12 MG/DL (ref 7–18)
BUN/CREAT SERPL: 11 (ref 12–20)
CALCIUM SERPL-MCNC: 8.9 MG/DL (ref 8.5–10.1)
CHLORIDE SERPL-SCNC: 103 MMOL/L (ref 100–108)
CK MB CFR SERPL CALC: NORMAL % (ref 0–4)
CK MB SERPL-MCNC: <1 NG/ML (ref 5–25)
CK SERPL-CCNC: 51 U/L (ref 39–308)
CO2 SERPL-SCNC: 28 MMOL/L (ref 21–32)
COLOR UR: YELLOW
CREAT SERPL-MCNC: 1.08 MG/DL (ref 0.6–1.3)
DIFFERENTIAL METHOD BLD: ABNORMAL
EOSINOPHIL # BLD: 0.1 K/UL (ref 0–0.4)
EOSINOPHIL NFR BLD: 1 % (ref 0–5)
ERYTHROCYTE [DISTWIDTH] IN BLOOD BY AUTOMATED COUNT: 13.3 % (ref 11.6–14.5)
GLUCOSE SERPL-MCNC: 106 MG/DL (ref 74–99)
GLUCOSE UR STRIP.AUTO-MCNC: NEGATIVE MG/DL
HCT VFR BLD AUTO: 39.2 % (ref 36–48)
HGB BLD-MCNC: 13.3 G/DL (ref 13–16)
HGB UR QL STRIP: NEGATIVE
KETONES UR QL STRIP.AUTO: NEGATIVE MG/DL
LACTATE BLD-SCNC: 2.9 MMOL/L (ref 0.4–2)
LEUKOCYTE ESTERASE UR QL STRIP.AUTO: NEGATIVE
LYMPHOCYTES # BLD: 0.5 K/UL (ref 0.9–3.6)
LYMPHOCYTES NFR BLD: 5 % (ref 21–52)
MCH RBC QN AUTO: 32.4 PG (ref 24–34)
MCHC RBC AUTO-ENTMCNC: 33.9 G/DL (ref 31–37)
MCV RBC AUTO: 95.4 FL (ref 74–97)
MONOCYTES # BLD: 0.6 K/UL (ref 0.05–1.2)
MONOCYTES NFR BLD: 6 % (ref 3–10)
NEUTS SEG # BLD: 8.7 K/UL (ref 1.8–8)
NEUTS SEG NFR BLD: 88 % (ref 40–73)
NITRITE UR QL STRIP.AUTO: NEGATIVE
PH UR STRIP: 7.5 [PH] (ref 5–8)
PLATELET # BLD AUTO: 221 K/UL (ref 135–420)
PMV BLD AUTO: 9.7 FL (ref 9.2–11.8)
POTASSIUM SERPL-SCNC: 3.8 MMOL/L (ref 3.5–5.5)
PROT UR STRIP-MCNC: NEGATIVE MG/DL
RBC # BLD AUTO: 4.11 M/UL (ref 4.7–5.5)
SODIUM SERPL-SCNC: 139 MMOL/L (ref 136–145)
SP GR UR REFRACTOMETRY: 1.02 (ref 1–1.03)
TROPONIN I SERPL-MCNC: <0.02 NG/ML (ref 0–0.04)
UROBILINOGEN UR QL STRIP.AUTO: 0.2 EU/DL (ref 0.2–1)
WBC # BLD AUTO: 9.9 K/UL (ref 4.6–13.2)

## 2018-02-28 PROCEDURE — 83880 ASSAY OF NATRIURETIC PEPTIDE: CPT | Performed by: EMERGENCY MEDICINE

## 2018-02-28 PROCEDURE — 74011250636 HC RX REV CODE- 250/636: Performed by: EMERGENCY MEDICINE

## 2018-02-28 PROCEDURE — 99285 EMERGENCY DEPT VISIT HI MDM: CPT

## 2018-02-28 PROCEDURE — 85025 COMPLETE CBC W/AUTO DIFF WBC: CPT | Performed by: EMERGENCY MEDICINE

## 2018-02-28 PROCEDURE — 93005 ELECTROCARDIOGRAM TRACING: CPT

## 2018-02-28 PROCEDURE — 71045 X-RAY EXAM CHEST 1 VIEW: CPT

## 2018-02-28 PROCEDURE — 96361 HYDRATE IV INFUSION ADD-ON: CPT

## 2018-02-28 PROCEDURE — 80048 BASIC METABOLIC PNL TOTAL CA: CPT | Performed by: EMERGENCY MEDICINE

## 2018-02-28 PROCEDURE — 74011250637 HC RX REV CODE- 250/637: Performed by: EMERGENCY MEDICINE

## 2018-02-28 PROCEDURE — 87040 BLOOD CULTURE FOR BACTERIA: CPT | Performed by: EMERGENCY MEDICINE

## 2018-02-28 PROCEDURE — 81003 URINALYSIS AUTO W/O SCOPE: CPT | Performed by: EMERGENCY MEDICINE

## 2018-02-28 PROCEDURE — 74011000250 HC RX REV CODE- 250: Performed by: EMERGENCY MEDICINE

## 2018-02-28 PROCEDURE — 96375 TX/PRO/DX INJ NEW DRUG ADDON: CPT

## 2018-02-28 PROCEDURE — 82550 ASSAY OF CK (CPK): CPT | Performed by: EMERGENCY MEDICINE

## 2018-02-28 PROCEDURE — 83605 ASSAY OF LACTIC ACID: CPT

## 2018-02-28 RX ORDER — ACETAMINOPHEN 325 MG/1
650 TABLET ORAL
Status: COMPLETED | OUTPATIENT
Start: 2018-02-28 | End: 2018-02-28

## 2018-02-28 RX ORDER — SODIUM CHLORIDE 0.9 % (FLUSH) 0.9 %
5-10 SYRINGE (ML) INJECTION AS NEEDED
Status: DISCONTINUED | OUTPATIENT
Start: 2018-02-28 | End: 2018-03-02 | Stop reason: HOSPADM

## 2018-02-28 RX ADMIN — ACETAMINOPHEN 650 MG: 325 TABLET ORAL at 23:44

## 2018-02-28 RX ADMIN — WATER 2 G: 1 INJECTION INTRAMUSCULAR; INTRAVENOUS; SUBCUTANEOUS at 23:44

## 2018-02-28 RX ADMIN — SODIUM CHLORIDE 2067 ML: 900 INJECTION, SOLUTION INTRAVENOUS at 23:44

## 2018-02-28 NOTE — IP AVS SNAPSHOT
[address] [place] [address] Clarinda Romy Patient: Yue Hall MRN: [id number] PKK:[birth year]9 About your hospitalization You were admitted on:  March 1, 2018 You last received care in the:  [facility] You were discharged on:  March 2, 2018 Why you were hospitalized Your primary diagnosis was:  Pneumonia Your diagnoses also included:  Pulmonary Fibrosis (Hcc), Af (Atrial Fibrillation) (Hcc), Chronic Anticoagulation, Acquired Hypothyroidism Follow-up Information Follow up With Details Comments Contact Info Yasmany Rizo NP On 3/7/2018 at 9:45AM [address] [address] 
[phone] Your Scheduled Appointments Tuesday April 24, 2018  9:00 AM EDT Follow Up with Demian Ventura MD  
[facility] ([address] [facility]) [place] [id number] [address] [phone] Discharge Orders None A check bree indicates which time of day the medication should be taken. My Medications START taking these medications Instructions Each Dose to Equal  
 Morning Noon Evening Bedtime  
 azithromycin 250 mg tablet Commonly known as:  Laymond Grumet Your last dose was: Your next dose is: Take 1 Tab by mouth daily for 3 days. 250 mg CONTINUE taking these medications Instructions Each Dose to Equal  
 Morning Noon Evening Bedtime  
 albuterol 90 mcg/actuation inhaler Commonly known as:  PROVENTIL HFA, VENTOLIN HFA, PROAIR HFA Your last dose was: Your next dose is: Take 2 Puffs by inhalation every four (4) hours as needed for Wheezing or Shortness of Breath. Ventolin HFA  
 2 Puff AMBIEN 10 mg tablet Generic drug:  zolpidem Your last dose was: Your next dose is: Take 12.5 mg by mouth nightly. 12.5 mg  
    
   
   
   
  
 apixaban 5 mg tablet Commonly known as:  Mian Smith Your last dose was: Your next dose is: Take 1 Tab by mouth two (2) times a day. 5 mg  
    
   
   
   
  
 aspirin delayed-release 81 mg tablet Your last dose was: Your next dose is: Take  by mouth daily. benzonatate 200 mg capsule Commonly known as:  TESSALON Your last dose was: Your next dose is: Take 200 mg by mouth three (3) times daily as needed for Cough. 200 mg BREO ELLIPTA 200-25 mcg/dose inhaler Generic drug:  fluticasone-vilanterol Your last dose was: Your next dose is: Take 1 Puff by inhalation daily. 1 Puff  
    
   
   
   
  
 CO Q-10 10 mg Cap Generic drug:  coenzyme q10 Your last dose was: Your next dose is: Take 100 mg by mouth daily. 100 mg  
    
   
   
   
  
 levothyroxine 50 mcg tablet Commonly known as:  SYNTHROID Your last dose was: Your next dose is: Take 50 mcg by mouth daily. 50 mcg LORazepam 1 mg tablet Commonly known as:  ATIVAN Your last dose was: Your next dose is: Take  by mouth every six (6) hours as needed. metoprolol tartrate 50 mg tablet Commonly known as:  LOPRESSOR Your last dose was: Your next dose is: Take 1 Tab by mouth two (2) times a day. 50 mg  
    
   
   
   
  
 nitroglycerin 0.4 mg SL tablet Commonly known as:  NITROSTAT Your last dose was: Your next dose is:    
   
   
 1 Tab by SubLINGual route every five (5) minutes as needed for Chest Pain. 0.4 mg  
    
   
   
   
  
 PriLOSEC OTC 20 mg tablet Generic drug:  omeprazole Your last dose was: Your next dose is: Take 20 mg by mouth as needed. 20 mg  
    
   
   
   
  
 ZOCOR 80 mg tablet Generic drug:  simvastatin Your last dose was: Your next dose is: Take 80 mg by mouth nightly. 80 mg  
    
   
   
   
  
 ZyrTEC 10 mg tablet Generic drug:  cetirizine Your last dose was: Your next dose is: Take  by mouth as needed. Where to Get Your Medications Information on where to get these meds will be given to you by the nurse or doctor. ! Ask your nurse or doctor about these medications  
  azithromycin 250 mg tablet Discharge Instructions Pneumonia: Care Instructions Your Care Instructions Pneumonia is an infection of the lungs. Most cases are caused by infections from bacteria or viruses. Pneumonia may be mild or very severe. If it is caused by bacteria, you will be treated with antibiotics. It may take a few weeks to a few months to recover fully from pneumonia, depending on how sick you were and whether your overall health is good. Follow-up care is a key part of your treatment and safety. Be sure to make and go to all appointments, and call your doctor if you are having problems. It's also a good idea to know your test results and keep a list of the medicines you take. How can you care for yourself at home? · Take your antibiotics exactly as directed. Do not stop taking the medicine just because you are feeling better. You need to take the full course of antibiotics. · Take your medicines exactly as prescribed. Call your doctor if you think you are having a problem with your medicine. · Get plenty of rest and sleep. You may feel weak and tired for a while, but your energy level will improve with time. · To prevent dehydration, drink plenty of fluids, enough so that your urine is light yellow or clear like water.  Choose water and other caffeine-free clear liquids until you feel better. If you have kidney, heart, or liver disease and have to limit fluids, talk with your doctor before you increase the amount of fluids you drink. · Take care of your cough so you can rest. A cough that brings up mucus from your lungs is common with pneumonia. It is one way your body gets rid of the infection. But if coughing keeps you from resting or causes severe fatigue and chest-wall pain, talk to your doctor. He or she may suggest that you take a medicine to reduce the cough. · Use a vaporizer or humidifier to add moisture to your bedroom. Follow the directions for cleaning the machine. · Do not smoke or allow others to smoke around you. Smoke will make your cough last longer. If you need help quitting, talk to your doctor about stop-smoking programs and medicines. These can increase your chances of quitting for good. · Take an over-the-counter pain medicine, such as acetaminophen (Tylenol), ibuprofen (Advil, Motrin), or naproxen (Aleve). Read and follow all instructions on the label. · Do not take two or more pain medicines at the same time unless the doctor told you to. Many pain medicines have acetaminophen, which is Tylenol. Too much acetaminophen (Tylenol) can be harmful. · If you were given a spirometer to measure how well your lungs are working, use it as instructed. This can help your doctor tell how your recovery is going. · To prevent pneumonia in the future, talk to your doctor about getting a flu vaccine (once a year) and a pneumococcal vaccine (one time only for most people). When should you call for help? Call 911 anytime you think you may need emergency care. For example, call if: 
? · You have severe trouble breathing. ?Call your doctor now or seek immediate medical care if: 
? · You cough up dark brown or bloody mucus (sputum). ? · You have new or worse trouble breathing. ? · You are dizzy or lightheaded, or you feel like you may faint. ? Watch closely for changes in your health, and be sure to contact your doctor if: 
? · You have a new or higher fever. ? · You are coughing more deeply or more often. ? · You are not getting better after 2 days (48 hours). ? · You do not get better as expected. Where can you learn more? Go to http://lena-may.info/. Enter [id number] in the search box to learn more about \"Pneumonia: Care Instructions. \" Current as of: May 12, 2017 Content Version: 11.4 © 9712-7657 [employer]. Care instructions adapted under license by [employer] (which disclaims liability or warranty for this information). If you have questions about a medical condition or this instruction, always ask your healthcare professional. [place] [address] any warranty or liability for your use of this information. ReadWorks Announcement We are excited to announce that we are making your provider's discharge notes available to you in ReadWorks. You will see these notes when they are completed and signed by the physician that discharged you from your recent hospital stay. If you have any questions or concerns about any information you see in ReadWorks, please call the Health Information Department where you were seen or reach out to your Primary Care Provider for more information about your plan of care. Introducing [facility] & HEALTH SERVICES! [facility] introduces ReadWorks patient portal. Now you can access parts of your medical record, email your doctor's office, and request medication refills online. 1. In your internet browser, go to [email or web address] 2. Click on the First Time User? Click Here link in the Sign In box. You will see the New Member Sign Up page. 3. Enter your ReadWorks Access Code exactly as it appears below.  You will not need to use this code after youve completed the sign-up process. If you do not sign up before the expiration date, you must request a new code. · Ind[employer] Access Code: [email or web address] Expires: 3/5/2018 12:42 PM 
 
4. Enter the last four digits of your Social Security Number (xxxx) and Date of Birth (mm/dd/yyyy) as indicated and click Submit. You will be taken to the next sign-up page. 5. Create a Indiewalls ID. This will be your Indiewalls login ID and cannot be changed, so think of one that is secure and easy to remember. 6. Create a Indiewalls password. You can change your password at any time. 7. Enter your Password Reset Question and Answer. This can be used at a later time if you forget your password. 8. Enter your e-mail address. You will receive e-mail notification when new information is available in [address] 9. Click Sign Up. You can now view and download portions of your medical record. 10. Click the Download Summary menu link to download a portable copy of your medical information. If you have questions, please visit the Frequently Asked Questions section of the Indiewalls website. Remember, Indiewalls is NOT to be used for urgent needs. For medical emergencies, dial 911. Now available from your iPhone and Android! Unresulted Labs-Please follow up with your PCP about these lab tests Order Current Status T4, FREE In process CULTURE, BLOOD Preliminary result CULTURE, BLOOD Preliminary result Providers Seen During Your Hospitalization Provider Specialty Primary office phone Sean Cook MD Emergency Medicine [phone] Farhat Baron MD Hospitalist [phone] Valentino Flowers MD Family Practice [phone] Your Primary Care Physician (PCP) Primary Care Physician Office Phone Office Fax Marie Eagle [phone] You are allergic to the following Allergen Reactions Levofloxacin Swelling Lipitor (Atorvastatin) Other (comments) Joint pain Recent Documentation Height Weight BMI Smoking Status 1.676 m 69.1 kg 24.6 kg/m2 Never Smoker Emergency Contacts Name Discharge Info Relation Home Work Mobile [facility] DISCHARGE CAREGIVER [3] Spouse [3] [phone] Vivi Severino  Spouse [3] [phone] Patient Belongings The following personal items are in your possession at time of discharge: 
  Dental Appliances: None  Visual Aid: None      Home Medications: None   Jewelry: None  Clothing: Shirt, Pajamas, Footwear    Other Valuables: None Please provide this summary of care documentation to your next provider. Signatures-by signing, you are acknowledging that this After Visit Summary has been reviewed with you and you have received a copy. Patient Signature:  ____________________________________________________________ Date:  ____________________________________________________________  
  
[facility] Provider Signature:  ____________________________________________________________ Date:  ____________________________________________________________

## 2018-03-01 PROBLEM — E03.9 ACQUIRED HYPOTHYROIDISM: Status: ACTIVE | Noted: 2018-03-01

## 2018-03-01 PROBLEM — J18.9 PNEUMONIA: Status: ACTIVE | Noted: 2018-03-01

## 2018-03-01 PROBLEM — Z79.01 CHRONIC ANTICOAGULATION: Status: ACTIVE | Noted: 2018-03-01

## 2018-03-01 LAB
ATRIAL RATE: 114 BPM
ATRIAL RATE: 119 BPM
CALCULATED P AXIS, ECG09: 26 DEGREES
CALCULATED P AXIS, ECG09: 40 DEGREES
CALCULATED R AXIS, ECG10: -40 DEGREES
CALCULATED R AXIS, ECG10: -48 DEGREES
CALCULATED T AXIS, ECG11: 12 DEGREES
CALCULATED T AXIS, ECG11: 25 DEGREES
DIAGNOSIS, 93000: NORMAL
DIAGNOSIS, 93000: NORMAL
FLUAV AG NPH QL IA: NEGATIVE
FLUBV AG NOSE QL IA: NEGATIVE
LACTATE BLD-SCNC: 2.4 MMOL/L (ref 0.4–2)
LACTATE SERPL-SCNC: 1.4 MMOL/L (ref 0.4–2)
P-R INTERVAL, ECG05: 200 MS
P-R INTERVAL, ECG05: 200 MS
Q-T INTERVAL, ECG07: 324 MS
Q-T INTERVAL, ECG07: 330 MS
QRS DURATION, ECG06: 110 MS
QRS DURATION, ECG06: 112 MS
QTC CALCULATION (BEZET), ECG08: 454 MS
QTC CALCULATION (BEZET), ECG08: 455 MS
VENTRICULAR RATE, ECG03: 114 BPM
VENTRICULAR RATE, ECG03: 119 BPM

## 2018-03-01 PROCEDURE — 74011250637 HC RX REV CODE- 250/637: Performed by: FAMILY MEDICINE

## 2018-03-01 PROCEDURE — 83605 ASSAY OF LACTIC ACID: CPT

## 2018-03-01 PROCEDURE — 74011250636 HC RX REV CODE- 250/636: Performed by: INTERNAL MEDICINE

## 2018-03-01 PROCEDURE — 94640 AIRWAY INHALATION TREATMENT: CPT

## 2018-03-01 PROCEDURE — 74011250637 HC RX REV CODE- 250/637: Performed by: INTERNAL MEDICINE

## 2018-03-01 PROCEDURE — 83605 ASSAY OF LACTIC ACID: CPT | Performed by: EMERGENCY MEDICINE

## 2018-03-01 PROCEDURE — 96365 THER/PROPH/DIAG IV INF INIT: CPT

## 2018-03-01 PROCEDURE — 36415 COLL VENOUS BLD VENIPUNCTURE: CPT | Performed by: EMERGENCY MEDICINE

## 2018-03-01 PROCEDURE — 87804 INFLUENZA ASSAY W/OPTIC: CPT | Performed by: INTERNAL MEDICINE

## 2018-03-01 PROCEDURE — 65660000000 HC RM CCU STEPDOWN

## 2018-03-01 PROCEDURE — 74011250636 HC RX REV CODE- 250/636: Performed by: EMERGENCY MEDICINE

## 2018-03-01 RX ORDER — ZOLPIDEM TARTRATE 5 MG/1
10 TABLET ORAL
Status: DISCONTINUED | OUTPATIENT
Start: 2018-03-01 | End: 2018-03-02 | Stop reason: HOSPADM

## 2018-03-01 RX ORDER — FUROSEMIDE 10 MG/ML
20 INJECTION INTRAMUSCULAR; INTRAVENOUS
Status: COMPLETED | OUTPATIENT
Start: 2018-03-01 | End: 2018-03-01

## 2018-03-01 RX ORDER — NITROGLYCERIN 0.4 MG/1
0.4 TABLET SUBLINGUAL
Status: DISCONTINUED | OUTPATIENT
Start: 2018-03-01 | End: 2018-03-02 | Stop reason: HOSPADM

## 2018-03-01 RX ORDER — METOPROLOL TARTRATE 50 MG/1
50 TABLET ORAL 2 TIMES DAILY
Status: DISCONTINUED | OUTPATIENT
Start: 2018-03-01 | End: 2018-03-01

## 2018-03-01 RX ORDER — SIMVASTATIN 40 MG/1
80 TABLET, FILM COATED ORAL
Status: DISCONTINUED | OUTPATIENT
Start: 2018-03-01 | End: 2018-03-02 | Stop reason: HOSPADM

## 2018-03-01 RX ORDER — ASPIRIN 81 MG/1
81 TABLET ORAL DAILY
Status: DISCONTINUED | OUTPATIENT
Start: 2018-03-01 | End: 2018-03-02 | Stop reason: HOSPADM

## 2018-03-01 RX ORDER — FUROSEMIDE 10 MG/ML
INJECTION INTRAMUSCULAR; INTRAVENOUS
Status: DISPENSED
Start: 2018-03-01 | End: 2018-03-01

## 2018-03-01 RX ORDER — LEVOTHYROXINE SODIUM 25 UG/1
50 TABLET ORAL DAILY
Status: DISCONTINUED | OUTPATIENT
Start: 2018-03-01 | End: 2018-03-02 | Stop reason: HOSPADM

## 2018-03-01 RX ORDER — BENZONATATE 100 MG/1
200 CAPSULE ORAL
Status: DISCONTINUED | OUTPATIENT
Start: 2018-03-01 | End: 2018-03-02 | Stop reason: HOSPADM

## 2018-03-01 RX ORDER — FLUTICASONE FUROATE AND VILANTEROL 200; 25 UG/1; UG/1
1 POWDER RESPIRATORY (INHALATION)
Status: DISCONTINUED | OUTPATIENT
Start: 2018-03-01 | End: 2018-03-02 | Stop reason: HOSPADM

## 2018-03-01 RX ORDER — METOPROLOL TARTRATE 50 MG/1
50 TABLET ORAL 2 TIMES DAILY
Status: DISCONTINUED | OUTPATIENT
Start: 2018-03-01 | End: 2018-03-02 | Stop reason: HOSPADM

## 2018-03-01 RX ORDER — FUROSEMIDE 10 MG/ML
40 INJECTION INTRAMUSCULAR; INTRAVENOUS DAILY
Status: DISCONTINUED | OUTPATIENT
Start: 2018-03-01 | End: 2018-03-02 | Stop reason: HOSPADM

## 2018-03-01 RX ORDER — LORAZEPAM 1 MG/1
1 TABLET ORAL
Status: DISCONTINUED | OUTPATIENT
Start: 2018-03-01 | End: 2018-03-02 | Stop reason: HOSPADM

## 2018-03-01 RX ORDER — IPRATROPIUM BROMIDE AND ALBUTEROL SULFATE 2.5; .5 MG/3ML; MG/3ML
3 SOLUTION RESPIRATORY (INHALATION)
Status: DISCONTINUED | OUTPATIENT
Start: 2018-03-01 | End: 2018-03-02 | Stop reason: HOSPADM

## 2018-03-01 RX ADMIN — APIXABAN 5 MG: 5 TABLET, FILM COATED ORAL at 09:11

## 2018-03-01 RX ADMIN — AZITHROMYCIN MONOHYDRATE 500 MG: 500 INJECTION, POWDER, LYOPHILIZED, FOR SOLUTION INTRAVENOUS at 23:22

## 2018-03-01 RX ADMIN — FUROSEMIDE 40 MG: 10 INJECTION, SOLUTION INTRAMUSCULAR; INTRAVENOUS at 09:11

## 2018-03-01 RX ADMIN — LEVOTHYROXINE SODIUM 50 MCG: 25 TABLET ORAL at 09:11

## 2018-03-01 RX ADMIN — METHYLPREDNISOLONE SODIUM SUCCINATE 60 MG: 125 INJECTION, POWDER, FOR SOLUTION INTRAMUSCULAR; INTRAVENOUS at 05:16

## 2018-03-01 RX ADMIN — METOPROLOL TARTRATE 50 MG: 50 TABLET ORAL at 21:38

## 2018-03-01 RX ADMIN — METHYLPREDNISOLONE SODIUM SUCCINATE 60 MG: 125 INJECTION, POWDER, FOR SOLUTION INTRAMUSCULAR; INTRAVENOUS at 13:33

## 2018-03-01 RX ADMIN — ASPIRIN 81 MG: 81 TABLET, COATED ORAL at 09:11

## 2018-03-01 RX ADMIN — METHYLPREDNISOLONE SODIUM SUCCINATE 60 MG: 125 INJECTION, POWDER, FOR SOLUTION INTRAMUSCULAR; INTRAVENOUS at 19:38

## 2018-03-01 RX ADMIN — SIMVASTATIN 80 MG: 40 TABLET, FILM COATED ORAL at 21:38

## 2018-03-01 RX ADMIN — SIMVASTATIN 80 MG: 40 TABLET, FILM COATED ORAL at 05:16

## 2018-03-01 RX ADMIN — METHYLPREDNISOLONE SODIUM SUCCINATE 125 MG: 125 INJECTION, POWDER, FOR SOLUTION INTRAMUSCULAR; INTRAVENOUS at 01:29

## 2018-03-01 RX ADMIN — APIXABAN 5 MG: 5 TABLET, FILM COATED ORAL at 21:38

## 2018-03-01 RX ADMIN — FLUTICASONE FUROATE AND VILANTEROL TRIFENATATE 1 PUFF: 200; 25 POWDER RESPIRATORY (INHALATION) at 09:00

## 2018-03-01 RX ADMIN — FUROSEMIDE 20 MG: 10 INJECTION, SOLUTION INTRAMUSCULAR; INTRAVENOUS at 01:30

## 2018-03-01 RX ADMIN — AZITHROMYCIN MONOHYDRATE 500 MG: 500 INJECTION, POWDER, LYOPHILIZED, FOR SOLUTION INTRAVENOUS at 00:25

## 2018-03-01 RX ADMIN — METHYLPREDNISOLONE SODIUM SUCCINATE 60 MG: 125 INJECTION, POWDER, FOR SOLUTION INTRAMUSCULAR; INTRAVENOUS at 23:22

## 2018-03-01 RX ADMIN — ZOLPIDEM TARTRATE 10 MG: 5 TABLET ORAL at 23:22

## 2018-03-01 NOTE — H&P
History & Physical    Patient: Corie Duenas MRN: [id number]  CSN: [id number]    YOB: 1945  Age: 74 y.o. Sex: male      DOA: 2/28/2018    Chief Complaint:   Chief Complaint   Patient presents with    Fever    Fatigue          HPI:     Corie Duenas is a 74 y.o.  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 Pt presents with worsening productive coug, chills and subjective fever that started last night while having dinner,  Pt had similar episode few weeks back where he was Dx with the flu/CAP but was not hospitalized. Ptr also has SOB on exertion but saturating well at rest.  Pt will be admitted  For CAP complicating his CHF exacerbation    Past Medical History:   Diagnosis Date    Arrhythmia     Bronchitis 6/2013        CABG x 4 2001    CAD (coronary artery disease)     Cardiac cath 12/06/2012    RCA prev stent patent. LM severe but patent (supplies pLAD & CX). dLAD 60-85% (2.25 x 30-mm Resolute stent, resid 0%). SVG to % (known). SVG to dLAD 100% (known). Seq SVG to pLAD & RI patent. LVEDP 18.  EF 60%. Poss mild mid anterior hypk.  Cardiac nuclear imaging test, low risk 01/16/2017    Low risk. Sm basal inferior fixed defect, likely artifact. No ischemia. No WMA. EF 65%. Neg EKG on pharm stress test.  Unchanged from study of 1/28/15.     Chest pain     atypical    Chronic lung disease     Chronic obstructive pulmonary disease (HCC)     Coronary artery disease     Status post CABG x 4, with stenting of the native RCA in Dec 2001/ EF 60%    HAYWARD (dyspnea on exertion)     Elevated liver enzymes     mild    Hypercholesterolemia     Hypertension     Palpitations     Pulmonary fibrosis ([address]     PVC's (premature ventricular contractions)     Unspecified sleep apnea     wife stated pt's doctor aware  is unable to jazmin use of cpap    Vertigo     mild       Past Surgical History:   Procedure Laterality Date    HAND/FINGER SURGERY UNLISTED  11-16-11    right    HX ANGIOPLASTY  12/2001, 2012    with stenting of native RCA    HX CHOLECYSTECTOMY  2010    HX CORONARY ARTERY BYPASS GRAFT  6/1996    x 4; Single SVG to distal LAD; sequential SVG to proximal LAD & intermediate marginal branch; single SVG to distal RCA    HX HEENT      CATARACT    HX HERNIA REPAIR  2007       Family History   Problem Relation Age of Onset    Hypertension Brother     Arthritis-osteo Brother     Cancer Mother      bone and breast cancer       Social History     Social History    Marital status:      Spouse name: N/A    Number of children: N/A    Years of education: N/A     Social History Main Topics    Smoking status: Never Smoker    Smokeless tobacco: Never Used    Alcohol use Yes      Comment: 10 drinks per week    Drug use: No    Sexual activity: Not on file     Other Topics Concern    Not on file     Social History Narrative       Prior to Admission medications    Medication Sig Start Date End Date Taking? Authorizing Provider   albuterol (PROVENTIL HFA, VENTOLIN HFA, PROAIR HFA) 90 mcg/actuation inhaler Take 2 Puffs by inhalation every four (4) hours as needed for Wheezing or Shortness of Breath. Ventolin HFA   Yes Historical Provider   aspirin delayed-release 81 mg tablet Take  by mouth daily. Yes Historical Provider   benzonatate (TESSALON) 200 mg capsule Take 200 mg by mouth three (3) times daily as needed for Cough. Yes Historical Provider   BREO ELLIPTA 200-25 mcg/dose inhaler Take 1 Puff by inhalation daily. 11/21/17  Yes Historical Provider   metoprolol tartrate (LOPRESSOR) 50 mg tablet Take 1 Tab by mouth two (2) times a day. 12/5/17  Yes Rosa M Velazquez MD   apixaban (ELIQUIS) 5 mg tablet Take 1 Tab by mouth two (2) times a day.  12/5/17  Yes Rosa M Velazquez MD   levothyroxine (SYNTHROID) 50 mcg tablet Take 50 mcg by mouth daily. 12/12/14  Yes Historical Provider   LORazepam (ATIVAN) 1 mg tablet Take  by mouth every six (6) hours as needed. 12/12/14  Yes Historical Provider   simvastatin (ZOCOR) 80 mg tablet Take 80 mg by mouth nightly. Yes Historical Provider   omeprazole (PRILOSEC OTC) 20 mg tablet Take 20 mg by mouth as needed. 2/15/11  Yes Historical Provider   zolpidem (AMBIEN) 10 mg tablet Take 12.5 mg by mouth nightly. Yes Historical Provider   coenzyme q10 (CO Q-10) 10 mg Cap Take 100 mg by mouth daily. 2/15/11  Yes Historical Provider   nitroglycerin (NITROSTAT) 0.4 mg SL tablet 1 Tab by SubLINGual route every five (5) minutes as needed for Chest Pain. 2/15/11   Misael Dior MD   cetirizine (ZYRTEC) 10 mg tablet Take  by mouth as needed. Historical Provider       Allergies   Allergen Reactions    Levofloxacin Swelling    Lipitor [Atorvastatin] Other (comments)     Joint pain         Review of Systems  GENERAL: Patient alert, awake and oriented times 3, able to communicate full sentences and in mild  Distress ion exertion   HEENT: No change in vision, no earache, tinnitus, sore throat or sinus congestion. NECK: No pain or stiffness. PULMONARY: No shortness of breath, cough or wheeze. Cardiovascular: no pnd / No CP, +ve orthopnea   GASTROINTESTINAL: No abdominal pain, nausea, vomiting or diarrhea, melena or bright red blood per rectum. GENITOURINARY: No urinary frequency, urgency, hesitancy or dysuria. MUSCULOSKELETAL: No joint . +ve muscle pain, no back pain, no recent trauma. DERMATOLOGIC: No rash, no itching, no lesions. ENDOCRINE: No polyuria, polydipsia, no heat or cold intolerance. No recent change in weight. HEMATOLOGICAL: No anemia or easy bruising or bleeding. NEUROLOGIC: No headache, seizures, numbness, tingling or weakness.        Physical Exam:     Physical Exam:  Visit Vitals    /67    Pulse (!) 106    Temp 99.1 °F (37.3 °C)    Resp 21    Ht 5' 6\" (1.676 m)    Wt 72.1 kg (158 lb 14.4 oz)    SpO2 95%    BMI 25.65 kg/m2           Temp (24hrs), Av.6 °F (38.1 °C), Min:99.1 °F (37.3 °C), Max:102.6 °F (39.2 °C)             General:  Alert, cooperative, mild distress, appears stated age. Head: Normocephalic, without obvious abnormality, atraumatic. Eyes:  Conjunctivae/corneas clear. PERRL, EOMs intact. Nose: Nares normal. No drainage or sinus tenderness. Neck: Supple, symmetrical, trachea midline, no adenopathy, thyroid: no enlargement, no carotid bruit and no JVD. Lungs:   Coarse BS with +ve wheezing and rhonchi   Heart:  Regular rate and rhythm, S1, S2 normal.     Abdomen: Soft, non-tender. Bowel sounds normal.    Extremities: Extremities normal, atraumatic, no cyanosis or edema. Pulses: 2+ and symmetric all extremities. Skin:  No rashes or lesions   Neurologic: AAOx3, No focal motor or sensory deficit. Labs Reviewed: All lab results for the last 24 hours reviewed.   CXR and EKG    Procedures/imaging: see electronic medical records for all procedures/Xrays and details which were not copied into this note but were reviewed prior to creation of Plan      Assessment/Plan     Principal Problem:    Pneumonia (3/1/2018)    Active Problems:    Pulmonary fibrosis ([address] (2017)      AF (atrial fibrillation) ([address] (2018)      Chronic anticoagulation (3/1/2018)       Pt will be admitted for CAP with Hx of pulmonary Fibrosis and CAD with possible Pulmonary edema on CXR/Combined CHF exacerbation 2 ry to PNA    Ceftriaxone and Zithromax  Will get Flu swab   Solumedrol   Continue Inhalers     Combined CHF Exacerbation 2 ry to Above>> lasix IV daily  Pt had a stress test on  which showed preserved EF with CAD  Continue Statin      Hx of A-fib >> continue BB and Eliquis     Hypothyroidism >> continue Synthroid        DVT/GI Prophylaxis: Eliquis    Plan of care is discussed in details with Patient/Family at bedside and agreed upon    [address] [facility],[address] [facility] Zaki Aguilera MD  3/1/2018 3:01 AM

## 2018-03-01 NOTE — ED TRIAGE NOTES
Per [facility] patient was brought in due to c/o fever and shaking and weakness. Patient has not been feeling well for the past few days. Has recently been treated for Pneumonia.  Patient has a hx of some heart problems and family wanted patient evaluated,

## 2018-03-01 NOTE — CONSULTS
[facility] - Consult Note    Consultation request by Heidy Cheek MD for advice/opinion related to evaluating Pneumonia    Date of  Admission: 2/28/2018 10:28 PM   Primary Care Physician:  Chad Cruz NP     The patient was seen, examined, and independently evaluated and I agree with the below assessment and plan by Evelia Lemus PA-C with the following comments. This gentleman his primary problem appears to be pulmonary and although one can have a component of diastolic failure with exacerbation of COPD or development of pneumonia as in this case, he currently does not appear to be showing any signs of heart failure and we will simply sign off and be available if needed during his hospital stay. Assessment:     Patient Active Problem List   Diagnosis Code    HAYWARD (dyspnea on exertion) R06.09    S/P CABG x 4 Z95.1    Hypercholesterolemia E78.00    Chronic PVCs. I49.3    Coronary artery disease, status post CABG X4 in June 1996, with stenting of the native RCA in December 2001/ EF 60%; s/p stenting in the LAD 12/6/12 I25.10    Mild liver enzyme elevation. R74.8    Atypical chest pain R07.89    Chest pain, unspecified B15.3    Diastolic dysfunction, left ventricle I51.9    COLD (chronic obstructive lung disease) ([facility]) J44.9    Pulmonary fibrosis ([facility]) J84.10    Sleep apnea, obstructive G47.33    AF (atrial fibrillation) ([facility]) I48.91    Pneumonia J18.9    Chronic anticoagulation Z79.01    Acquired hypothyroidism E03.9       -Presented with fevers and chills, admitted with PNA, repeat CXR?  -Pneumonia with fevers and chills, recently treated for PNA/Flu as outpatient. ?  -Pulmonary fibrosis. -H/o diastolic heart failure in setting of afib. Normal LV function 50-55% on echo 12/2017. Normal BNP this admission. Do not suspect decompensated heart failure. -AKIL. Not using CPAP.   -CAD s/p CABG X 4 1996 (SVG to dLAD, SVG to pLAD and intermediate marginal branch, SVG to dRCA) with subsequent PCI. No ischemia on nuclear stress test 1/2017.  -Paroxysmal atrial fibrillation. NSR this admission. On Eliquis and BB. Plan:     No evidence to suggest decompensated heart failure or unstable angina. Rhythm remains stable. Would continue home cardiac regimen. No further cardiac work-up planned. History of Present Illness: This is a 74 y.o. male admitted for Pneumonia. Patient complains of:  Fevers and chills. Patient recently treated with PNA and flu. Patient initially felt better. Fevers and chills returned yesterday however. Patient is admitted with PNA. Patient denies increase in chronic SOB. Patient denies CP, palp, syncope, orthopnea, PND, CHEO. Cardiac risk factors: CAD, diastolic heart failure,     Review of Symptoms:  Except as stated above include:  Constitutional: Fevers and chills  Respiratory: Recent cough. Chronic SOB  Cardiovascular:  Denies CP, palp  Gastrointestinal: negative  Genitourinary:  negative  Musculoskeletal:  Negative  Neurological:  Negative  Dermatological:  Negative  Endocrinological: Negative  Psychological:  Negative         Past Medical History:     Past Medical History:   Diagnosis Date    Arrhythmia     Bronchitis 6/2013        CABG x 4 2001    CAD (coronary artery disease)     Cardiac cath 12/06/2012    RCA prev stent patent. LM severe but patent (supplies pLAD & CX). dLAD 60-85% (2.25 x 30-mm Resolute stent, resid 0%). SVG to % (known). SVG to dLAD 100% (known). Seq SVG to pLAD & RI patent. LVEDP 18.  EF 60%. Poss mild mid anterior hypk.  Cardiac nuclear imaging test, low risk 01/16/2017    Low risk. Sm basal inferior fixed defect, likely artifact. No ischemia. No WMA. EF 65%. Neg EKG on pharm stress test.  Unchanged from study of 1/28/15.     Chest pain     atypical    Chronic lung disease     Chronic obstructive pulmonary disease (HCC)     Coronary artery disease     Status post CABG x 4, with stenting of the native RCA in Dec 2001/ EF 60%    HAYWARD (dyspnea on exertion)     Elevated liver enzymes     mild    Hypercholesterolemia     Hypertension     Palpitations     Pulmonary fibrosis (HCC)     PVC's (premature ventricular contractions)     Unspecified sleep apnea     wife stated pt's doctor aware  is unable to jazmin use of cpap    Vertigo     mild         Social History:     Social History     Social History    Marital status:      Spouse name: N/A    Number of children: N/A    Years of education: N/A     Social History Main Topics    Smoking status: Never Smoker    Smokeless tobacco: Never Used    Alcohol use Yes      Comment: 10 drinks per week    Drug use: No    Sexual activity: Not on file     Other Topics Concern    Not on file     Social History Narrative        Family History:     Family History   Problem Relation Age of Onset    Hypertension Brother     Arthritis-osteo Brother     Cancer Mother      bone and breast cancer        Medications:      Allergies   Allergen Reactions    Levofloxacin Swelling    Lipitor [Atorvastatin] Other (comments)     Joint pain        Current Facility-Administered Medications   Medication Dose Route Frequency    aspirin delayed-release tablet 81 mg  81 mg Oral DAILY    benzonatate (TESSALON) capsule 200 mg  200 mg Oral TID PRN    fluticasone-vilanterol (BREO ELLIPTA) 200mcg-25mcg/puff  1 Puff Inhalation QAM RT    levothyroxine (SYNTHROID) tablet 50 mcg  50 mcg Oral DAILY    LORazepam (ATIVAN) tablet 1 mg  1 mg Oral Q6H PRN    nitroglycerin (NITROSTAT) tablet 0.4 mg  0.4 mg SubLINGual Q5MIN PRN    zolpidem (AMBIEN) tablet 10 mg  10 mg Oral QHS    simvastatin (ZOCOR) tablet 80 mg  80 mg Oral QHS    methylPREDNISolone (PF) (SOLU-MEDROL) injection 60 mg  60 mg IntraVENous Q6H    furosemide (LASIX) injection 40 mg  40 mg IntraVENous DAILY    albuterol-ipratropium (DUO-NEB) 2.5 MG-0.5 MG/3 ML  3 mL Nebulization Q6H PRN    metoprolol tartrate (LOPRESSOR) tablet 50 mg  50 mg Oral BID    apixaban (ELIQUIS) tablet 5 mg  5 mg Oral BID    sodium chloride (NS) flush 5-10 mL  5-10 mL IntraVENous PRN    cefTRIAXone (ROCEPHIN) 2 g in sterile water (preservative free) 20 mL IV syringe  2 g IntraVENous Q24H    azithromycin (ZITHROMAX) 500 mg in 0.9% sodium chloride (MBP/ADV) 250 mL adv  500 mg IntraVENous Q24H         Physical Exam:     Visit Vitals    /67 (BP 1 Location: Left arm, BP Patient Position: At rest)    Pulse 88    Temp 98.3 °F (36.8 °C)    Resp 16    Ht 5' 6\" (1.676 m)    Wt 72.1 kg (158 lb 14.4 oz)    SpO2 93%    BMI 25.65 kg/m2     BP Readings from Last 3 Encounters:   03/01/18 118/67   01/23/18 134/60   01/23/18 130/68     Pulse Readings from Last 3 Encounters:   03/01/18 88   01/23/18 72   01/23/18 86     Wt Readings from Last 3 Encounters:   03/01/18 72.1 kg (158 lb 14.4 oz)   01/23/18 68.5 kg (151 lb)   01/23/18 69.2 kg (152 lb 9.6 oz)       General:  alert, cooperative, no distress, appears stated age  Neck:  no JVD  Lungs:  rales bibasilar  Heart:  regular rate and rhythm  Abdomen:  abdomen is soft without significant tenderness, masses, organomegaly or guarding  Extremities:  extremities normal, atraumatic, no cyanosis or edema  Skin: Warm and dry.  no hyperpigmentation, vitiligo, or suspicious lesions  Neuro: alert, oriented x3, affect appropriate  Psych: non focal     Data Review:     Recent Labs      02/28/18   2300   WBC  9.9   HGB  13.3   HCT  39.2   PLT  221     Recent Labs      02/28/18   2300   NA  139   K  3.8   CL  103   CO2  28   GLU  106*   BUN  12   CREA  1.08   CA  8.9       Results for orders placed or performed during the hospital encounter of 02/28/18   EKG, 12 LEAD, INITIAL   Result Value Ref Range    Ventricular Rate 119 BPM    Atrial Rate 119 BPM    P-R Interval 200 ms    QRS Duration 112 ms    Q-T Interval 324 ms    QTC Calculation (Bezet) 455 ms    Calculated P Axis 26 degrees Calculated R Axis -48 degrees    Calculated T Axis 25 degrees    Diagnosis       Sinus tachycardia with premature atrial complexes in a pattern of bigeminy  Incomplete right bundle branch block  Left anterior fascicular block  Minimal voltage criteria for LVH, may be normal variant  ST elevation, consider inferior injury or acute infarct  ACUTE MI  Consider right ventricular involvement in acute inferior infarct  Abnormal ECG  When compared with ECG of 2017 10:39,  premature atrial complexes are now present  Vent. rate has increased BY  44 BPM  Non-specific change in ST segment in Anterior leads     Results for orders placed or performed in visit on 18   AMB POC EKG ROUTINE W/ 12 LEADS, INTER & REP    Narrative    EKG: normal sinus rhythm, RBBB, left axis deviation, converted to NSR since 17. Results for orders placed or performed during the hospital encounter of 17   ECG HOLTER MONITOR, UP TO [address], [address]                                         Test Date:    2017  Irineo Garcia Name:     [facility]             Department:     Patient ID:   [id number]                Room:           Gender:       Male                   Technician:     :          [id number]               Requested By: Dr. Deana Noland Number:                          Leydi MD:   Mari Garcia MD                    Interpretive Statements  Spike Earl was in Sinus Rhythm. The average heart rate, excluding ectopy, was 75 BPM with a minimum of 36 BPM   at 05:30 D2 and a maximum of 114 BPM at  12:03 D2. Heart beats, including ectopy, totaled 166584 beats. There were no VENTRICULAR ectopics found. SUPRAVENTRICULAR ECTOPICS totaled 18223  averaging  704. 3 per hour  ,with   07103 single and 810 paired beats.    SUPRAVENTRICULAR TACHYCARDIA occurred 9 times. The fastest run was at 165 BPM and occurred at 10:34 D2 with 3 beats. The longest run was 5 beats at 10:56 D2 at a rate of 146 BPM.    1. Rhythm is sinus. 2. LA and QRS within normal limits. 3. Frequent (15,471) single SVEs, 810 paired SVEs,   4. 9 short runs of non-sustained SVT. Longest episode for 5 beats.   Electronically signed by Jazlyn Shah MD on Dec 15 2017  3:57PM CDT       All Cardiac Markers in the last 24 hours:    Lab Results   Component Value Date/Time    CPK 51 02/28/2018 11:00 PM    CKMB <1.0 02/28/2018 11:00 PM    CKND1  02/28/2018 11:00 PM     CALCULATION NOT PERFORMED WHEN RESULT IS BELOW LINEAR LIMIT    Noman Aggarwal <0.02 02/28/2018 11:00 PM       Last Lipid:    Lab Results   Component Value Date/Time    Cholesterol, total 157 10/12/2010 09:00 AM    HDL Cholesterol 58 10/12/2010 09:00 AM    LDL, calculated 70.6 10/12/2010 09:00 AM    Triglyceride 142 10/12/2010 09:00 AM    CHOL/HDL Ratio 2.7 10/12/2010 09:00 AM       Signed By: Guerrero Bernal DO     March 1, 2018

## 2018-03-01 NOTE — ED NOTES
Patient's heart rate noted to elevate to 150 when moving in bed. Dr. Vale Jones notified. Telephone order to transfer patient to telemetry bed received.

## 2018-03-01 NOTE — ED NOTES
Received nursing report from [facility]. Patient is A/O x4, resting comfortably on the stretcher. No signs of acute distress noted, will continue to monitor.

## 2018-03-01 NOTE — ED PROVIDER NOTES
EMERGENCY DEPARTMENT HISTORY AND PHYSICAL EXAM    10:44 PM      Date: 2/28/2018  Patient Name: Corie Duenas    History of Presenting Illness     Chief Complaint   Patient presents with    Fever    Fatigue         History Provided By: Patient and Patient's Wife    Chief Complaint: sob  Duration: 3 Hours  Timing:  Constant  Location: chest  Quality: Tightness  Severity: Moderate  Modifying Factors:   Associated Symptoms: fever, chills, fatigue, cough       Additional History (Context): Corie Duenas is a 74 y.o. male with history of CABG x 4, COPD, arrhythmia, hypertension, pulmonary fibrosis who presents with SOB with associated fever, cough, chills and fatigue that began about 3 hours ago. He denies chest pain. He notes previous similar episode in January that resulted in pneumonia diagnosis. Patient is on Eliquis. Patient is not on O2 at home. No other symptoms or concerns were expressed. PCP: Lisa Reddy NP    Current Facility-Administered Medications   Medication Dose Route Frequency Provider Last Rate Last Dose    sodium chloride (NS) flush 5-10 mL  5-10 mL IntraVENous PRN Jody Box MD        cefTRIAXone (ROCEPHIN) 2 g in sterile water (preservative free) 20 mL IV syringe  2 g IntraVENous Q24H Jody Box MD   2 g at 02/28/18 2344    azithromycin (ZITHROMAX) 500 mg in 0.9% sodium chloride (MBP/ADV) 250 mL adv  500 mg IntraVENous Q24H Jody Box  mL/hr at 03/01/18 0025 500 mg at 03/01/18 0025     Current Outpatient Prescriptions   Medication Sig Dispense Refill    albuterol (PROVENTIL HFA, VENTOLIN HFA, PROAIR HFA) 90 mcg/actuation inhaler Take 2 Puffs by inhalation every four (4) hours as needed for Wheezing or Shortness of Breath. Ventolin HFA      aspirin delayed-release 81 mg tablet Take  by mouth daily.  benzonatate (TESSALON) 200 mg capsule Take 200 mg by mouth three (3) times daily as needed for Cough.       BREO ELLIPTA 200-25 mcg/dose inhaler Take 1 Puff by inhalation daily.  metoprolol tartrate (LOPRESSOR) 50 mg tablet Take 1 Tab by mouth two (2) times a day. 60 Tab 6    apixaban (ELIQUIS) 5 mg tablet Take 1 Tab by mouth two (2) times a day. 60 Tab 6    levothyroxine (SYNTHROID) 50 mcg tablet Take 50 mcg by mouth daily.  LORazepam (ATIVAN) 1 mg tablet Take  by mouth every six (6) hours as needed.  simvastatin (ZOCOR) 80 mg tablet Take 80 mg by mouth nightly.  nitroglycerin (NITROSTAT) 0.4 mg SL tablet 1 Tab by SubLINGual route every five (5) minutes as needed for Chest Pain. 25 Tab 3    omeprazole (PRILOSEC OTC) 20 mg tablet Take 20 mg by mouth as needed.  cetirizine (ZYRTEC) 10 mg tablet Take  by mouth as needed.  zolpidem (AMBIEN) 10 mg tablet Take 12.5 mg by mouth nightly.  coenzyme q10 (CO Q-10) 10 mg Cap Take 100 mg by mouth daily. Past History     Past Medical History:  Past Medical History:   Diagnosis Date    Arrhythmia     Bronchitis 6/2013        CABG x 4 2001    CAD (coronary artery disease)     Cardiac cath 12/06/2012    RCA prev stent patent. LM severe but patent (supplies pLAD & CX). dLAD 60-85% (2.25 x 30-mm Resolute stent, resid 0%). SVG to % (known). SVG to dLAD 100% (known). Seq SVG to pLAD & RI patent. LVEDP 18.  EF 60%. Poss mild mid anterior hypk.  Cardiac nuclear imaging test, low risk 01/16/2017    Low risk. Sm basal inferior fixed defect, likely artifact. No ischemia. No WMA. EF 65%. Neg EKG on pharm stress test.  Unchanged from study of 1/28/15.     Chest pain     atypical    Chronic lung disease     Chronic obstructive pulmonary disease (HCC)     Coronary artery disease     Status post CABG x 4, with stenting of the native RCA in Dec 2001/ EF 60%    HAYWARD (dyspnea on exertion)     Elevated liver enzymes     mild    Hypercholesterolemia     Hypertension     Palpitations     Pulmonary fibrosis ([address]     PVC's (premature ventricular contractions)  Unspecified sleep apnea     wife stated pt's doctor aware  is unable to jazmin use of cpap    Vertigo     mild       Past Surgical History:  Past Surgical History:   Procedure Laterality Date    HAND/FINGER SURGERY UNLISTED  11-16-11    right    HX ANGIOPLASTY  12/2001, 2012    with stenting of native RCA    HX CHOLECYSTECTOMY  2010    HX CORONARY ARTERY BYPASS GRAFT  6/1996    x 4; Single SVG to distal LAD; sequential SVG to proximal LAD & intermediate marginal branch; single SVG to distal RCA    HX HEENT      CATARACT    HX HERNIA REPAIR  2007       Family History:  Family History   Problem Relation Age of Onset    Hypertension Brother     Arthritis-osteo Brother     Cancer Mother      bone and breast cancer       Social History:  Social History   Substance Use Topics    Smoking status: Never Smoker    Smokeless tobacco: Never Used    Alcohol use Yes      Comment: 10 drinks per week       Allergies: Allergies   Allergen Reactions    Levofloxacin Swelling    Lipitor [Atorvastatin] Other (comments)     Joint pain         Review of Systems       Review of Systems   Constitutional: Positive for chills, fatigue and fever. Respiratory: Positive for cough and shortness of breath. Cardiovascular: Negative for chest pain. All other systems reviewed and are negative. Physical Exam     Visit Vitals    /60    Pulse (!) 114    Temp (!) 100.7 °F (38.2 °C)    Resp 25    Ht 5' 7\" (1.702 m)    Wt 68.9 kg (152 lb)    SpO2 95%    BMI 23.81 kg/m2         Physical Exam  Physical Exam:  .   Patient Vitals for the past 12 hrs:   Temp Pulse Resp BP SpO2   03/01/18 0025 (!) 100.7 °F (38.2 °C) - - - -   02/28/18 2315 - (!) 114 25 170/60 95 %   02/28/18 2300 - (!) 116 29 134/63 95 %   02/28/18 2248 (!) 102.6 °F (39.2 °C) (!) 121 27 155/72 93 %   02/28/18 2230 - (!) 119 24 - -     Gen: Well developed, well nourished 74 y.o. male  HEENT: Normocephalic, atraumatic  Respiratory: No accessory muscle use. Coarse breath sounds. Normal chest wall excursion. No subcutaneous air, no rib crepitus  Cardiovascular: Regular rhythm and rate, Normal pulses, Normal perfusion. No edema  Gastrointestinal: Non distended, Non tender, No masses. No ascites. No organomegaly. No evidence of trauma  Musculoskeletal: Full range of motion at all other tested joints. No joint effusions. Neuro: Normal strength, Normal sensation. Normal speech. No ataxia. Cranial Nerves II-XII normal as tested. Skin: No rash, petechia or purpura. Warm and dry. Midline chest scar consistent with history of bypass surgery. Psyche: No suicidal ideation, No homicidal ideation. No hallucinations. Organized thoughts. Heme: Normal  : Deferred      Diagnostic Study Results     Labs -  Recent Results (from the past 12 hour(s))   CBC WITH AUTOMATED DIFF    Collection Time: 02/28/18 11:00 PM   Result Value Ref Range    WBC 9.9 4.6 - 13.2 K/uL    RBC 4.11 (L) 4.70 - 5.50 M/uL    HGB 13.3 13.0 - 16.0 g/dL    HCT 39.2 36.0 - 48.0 %    MCV 95.4 74.0 - 97.0 FL    MCH 32.4 24.0 - 34.0 PG    MCHC 33.9 31.0 - 37.0 g/dL    RDW 13.3 11.6 - 14.5 %    PLATELET 855 609 - 121 K/uL    MPV 9.7 9.2 - 11.8 FL    NEUTROPHILS 88 (H) 40 - 73 %    LYMPHOCYTES 5 (L) 21 - 52 %    MONOCYTES 6 3 - 10 %    EOSINOPHILS 1 0 - 5 %    BASOPHILS 0 0 - 2 %    ABS. NEUTROPHILS 8.7 (H) 1.8 - 8.0 K/UL    ABS. LYMPHOCYTES 0.5 (L) 0.9 - 3.6 K/UL    ABS. MONOCYTES 0.6 0.05 - 1.2 K/UL    ABS. EOSINOPHILS 0.1 0.0 - 0.4 K/UL    ABS.  BASOPHILS 0.0 0.0 - 0.1 K/UL    DF AUTOMATED     METABOLIC PANEL, BASIC    Collection Time: 02/28/18 11:00 PM   Result Value Ref Range    Sodium 139 136 - 145 mmol/L    Potassium 3.8 3.5 - 5.5 mmol/L    Chloride 103 100 - 108 mmol/L    CO2 28 21 - 32 mmol/L    Anion gap 8 3.0 - 18 mmol/L    Glucose 106 (H) 74 - 99 mg/dL    BUN 12 7.0 - 18 MG/DL    Creatinine 1.08 0.6 - 1.3 MG/DL    BUN/Creatinine ratio 11 (L) 12 - 20      GFR est AA >60 >60 ml/min/1.73m2    GFR est non-AA >60 >60 ml/min/1.73m2    Calcium 8.9 8.5 - 10.1 MG/DL   CARDIAC PANEL,(CK, CKMB & TROPONIN)    Collection Time: 02/28/18 11:00 PM   Result Value Ref Range    CK 51 39 - 308 U/L    CK - MB <1.0 <3.6 ng/ml    CK-MB Index  0.0 - 4.0 %     CALCULATION NOT PERFORMED WHEN RESULT IS BELOW LINEAR LIMIT    Troponin-I, Qt. <0.02 0.0 - 0.045 NG/ML   NT-PRO BNP    Collection Time: 02/28/18 11:00 PM   Result Value Ref Range    NT pro- 0 - 1800 PG/ML   URINALYSIS W/ RFLX MICROSCOPIC    Collection Time: 02/28/18 11:15 PM   Result Value Ref Range    Color YELLOW      Appearance CLEAR      Specific gravity 1.019 1.005 - 1.030      pH (UA) 7.5 5.0 - 8.0      Protein NEGATIVE  NEG mg/dL    Glucose NEGATIVE  NEG mg/dL    Ketone NEGATIVE  NEG mg/dL    Bilirubin NEGATIVE  NEG      Blood NEGATIVE  NEG      Urobilinogen 0.2 0.2 - 1.0 EU/dL    Nitrites NEGATIVE  NEG      Leukocyte Esterase NEGATIVE  NEG     POC LACTIC ACID    Collection Time: 02/28/18 11:16 PM   Result Value Ref Range    Lactic Acid (POC) 2.9 (HH) 0.4 - 2.0 mmol/L       Radiologic Studies -   XR CHEST SNGL V     Left lower lobe pneumonia. As interpreted by Sudhakar Fuentes MD         Medical Decision Making   I am the first provider for this patient. I reviewed the vital signs, available nursing notes, past medical history, past surgical history, family history and social history. Vital Signs-Reviewed the patient's vital signs. EKG: Interpreted by the EP. Time Interpreted: 22:56   Rate: 114    Rhythm: Sinus Tachycardia    Interpretation:  ms. Qtc 454 ms. No STEMI. Records Reviewed: Nursing Notes and Old Medical Records (Time of Review: 10:44 PM)              Diagnosis     Clinical Impression: [facility]  Patient Active Problem List   Diagnosis Code    HAYWARD (dyspnea on exertion) R06.09    S/P CABG x 4 Z95.1    Hypercholesterolemia E78.00    Chronic PVCs.  I49.3    Coronary artery disease, status post CABG X4 in June 1996, with stenting of the native RCA in December 2001/ EF 60%; s/p stenting in the LAD 12/6/12 I25.10    Mild liver enzyme elevation. R74.8    Atypical chest pain R07.89    Chest pain, unspecified E59.9    Diastolic dysfunction, left ventricle I51.9    COLD (chronic obstructive lung disease) ([facility]) J44.9    Pulmonary fibrosis ([facility]) J84.10    Sleep apnea, obstructive G47.33    AF (atrial fibrillation) ([facility]) I48.91    Pneumonia J18.9    Chronic anticoagulation Z79.01    Acquired hypothyroidism E03.9         Disposition: Admit    Follow-up Information     None           Patient's Medications   Start Taking    No medications on file   Continue Taking    ALBUTEROL (PROVENTIL HFA, VENTOLIN HFA, PROAIR HFA) 90 MCG/ACTUATION INHALER    Take 2 Puffs by inhalation every four (4) hours as needed for Wheezing or Shortness of Breath. Ventolin HFA    APIXABAN (ELIQUIS) 5 MG TABLET    Take 1 Tab by mouth two (2) times a day. ASPIRIN DELAYED-RELEASE 81 MG TABLET    Take  by mouth daily. BENZONATATE (TESSALON) 200 MG CAPSULE    Take 200 mg by mouth three (3) times daily as needed for Cough. BREO ELLIPTA 200-25 MCG/DOSE INHALER    Take 1 Puff by inhalation daily. CETIRIZINE (ZYRTEC) 10 MG TABLET    Take  by mouth as needed. COENZYME Q10 (CO Q-10) 10 MG CAP    Take 100 mg by mouth daily. LEVOTHYROXINE (SYNTHROID) 50 MCG TABLET    Take 50 mcg by mouth daily. LORAZEPAM (ATIVAN) 1 MG TABLET    Take  by mouth every six (6) hours as needed. METOPROLOL TARTRATE (LOPRESSOR) 50 MG TABLET    Take 1 Tab by mouth two (2) times a day. NITROGLYCERIN (NITROSTAT) 0.4 MG SL TABLET    1 Tab by SubLINGual route every five (5) minutes as needed for Chest Pain. OMEPRAZOLE (PRILOSEC OTC) 20 MG TABLET    Take 20 mg by mouth as needed. SIMVASTATIN (ZOCOR) 80 MG TABLET    Take 80 mg by mouth nightly. ZOLPIDEM (AMBIEN) 10 MG TABLET    Take 12.5 mg by mouth nightly.    These Medications have changed    No medications on file   Stop Taking    No medications on file     _______________________________    Attestations:  Abdirashid Arboleda acting as a scribe for and in the presence of Ezequiel Aguirre MD      February 28, 2018 at 10:44 PM       Provider Attestation:      I personally performed the services described in the documentation, reviewed the documentation, as recorded by the scribe in my presence, and it accurately and completely records my words and actions.  February 28, 2018 at 10:44 PM - Ignacio Marlow MD    _______________________________

## 2018-03-01 NOTE — PROGRESS NOTES
conducted an initial consultation and Spiritual Assessment for Lj Mariano, who is a 74 y. o.,male. Patients Primary Language is: [place]. According to the patients EMR Oriental orthodox Affiliation is: Jannette Gudino.     The reason the Patient came to the hospital is:   Patient Active Problem List    Diagnosis Date Noted    Pneumonia 03/01/2018    Chronic anticoagulation 03/01/2018    Acquired hypothyroidism 03/01/2018    AF (atrial fibrillation) ([facility] 01/23/2018    Sleep apnea, obstructive 12/05/2017    Pulmonary fibrosis ([facility] 07/25/2017    COLD (chronic obstructive lung disease) ([facility] 84/71/4890    Diastolic dysfunction, left ventricle 01/07/2013    Chest pain, unspecified 06/27/2012    Mild liver enzyme elevation. 08/09/2011    Atypical chest pain 08/09/2011    Coronary artery disease, status post CABG X4 in June 1996, with stenting of the native RCA in December 2001/ EF 60%; s/p stenting in the LAD 12/6/12     Hypercholesterolemia     Chronic PVCs.  HAYWARD (dyspnea on exertion)     S/P CABG x 4         The  provided the following Interventions:  Initiated a relationship of care and support. Explored issues of rafael, belief, spirituality and Mandaen/ritual needs while hospitalized. Listened empathically. Provided information about Spiritual Care Services. Chart reviewed. The following outcomes where achieved:  Patient expressed gratitude for 's visit. Assessment:  Patient does not have any Mandaen/cultural needs that will affect patients preferences in health care. There are no spiritual or Mandaen issues which require intervention at this time. Plan:  Chaplains will continue to follow and will provide pastoral care on an as needed/requested basis.  recommends bedside caregivers page  on duty if patient shows signs of acute spiritual or emotional distress.     [address]  [phone])

## 2018-03-01 NOTE — ROUTINE PROCESS
TRANSFER - OUT REPORT:    Verbal report given to Endy Olson RN (name) on Irma Herrera  being transferred to [employer] (unit) for routine progression of care       Report consisted of patients Situation, Background, Assessment and   Recommendations(SBAR). Information from the following report(s) SBAR, ED Summary and MAR was reviewed with the receiving nurse. Lines:   Peripheral IV 02/28/18 Right Antecubital (Active)   Site Assessment Clean, dry, & intact 2/28/2018 11:19 PM   Phlebitis Assessment 0 2/28/2018 11:19 PM   Infiltration Assessment 0 2/28/2018 11:19 PM   Dressing Status Clean, dry, & intact 2/28/2018 11:19 PM   Dressing Type Transparent;Tape 2/28/2018 11:19 PM   Hub Color/Line Status Pink;Patent; Flushed 2/28/2018 11:19 PM   Action Taken Blood drawn 2/28/2018 11:19 PM       Peripheral IV 02/28/18 Left Antecubital (Active)   Site Assessment Clean, dry, & intact 2/28/2018 11:20 PM   Phlebitis Assessment 0 2/28/2018 11:20 PM   Infiltration Assessment 0 2/28/2018 11:20 PM   Dressing Status Clean, dry, & intact 2/28/2018 11:20 PM   Dressing Type Transparent;Tape 2/28/2018 11:20 PM   Hub Color/Line Status Blue;Patent; Flushed 2/28/2018 11:20 PM   Action Taken Blood drawn 2/28/2018 11:20 PM        Opportunity for questions and clarification was provided.       Patient transported with:   Monitor  Registered Nurse   2L Oxygen

## 2018-03-01 NOTE — ROUTINE PROCESS
TRANSFER - IN REPORT:    Verbal report received from Christine Still RN(name) on Sheyla Matias  being received from ED(unit) for routine progression of care      Report consisted of patients Situation, Background, Assessment and   Recommendations(SBAR). Information from the following report(s) SBAR, Kardex, ED Summary, Intake/Output, MAR, Recent Results and Cardiac Rhythm Sinus Tachycardia was reviewed with the receiving nurse. Opportunity for questions and clarification was provided. Assessment completed upon patients arrival to unit and care assumed.

## 2018-03-01 NOTE — PROGRESS NOTES
[facility] Hospitalist Group  Progress Note    Patient: Jannelle Nissen Age: 74 y.o. : 1945 MR#: [id number] SSN: [id number]  Date/Time: 3/1/2018 10:23 AM    Subjective/24-hour events:     Feeling better overall. No chest pain or SOB. Afebrile overnight. Assessment:   Fever/chills  Pulmonary fibrosis  Atrial fibrillation on chronic anticoagulation therapy (Eliquis)  Hypothyroidism    Plan:  Continue rocephin and azithromycin as ordered, follow cultures. Influenza is negative. PRN nebs, scheduled solumedrol as ordered. Thyroid studies in AM.  No acute cardiac issues. Home soon from medical perspective if remains stable. Case discussed with:  [x]Patient  []Family  []Nursing  []Case Management  DVT Prophylaxis:  [x]Eliquis  []Hep SQ  []SCDs  []Coumadin   []On Heparin gtt    Objective:   VS:   Visit Vitals    BP 96/58 (BP 1 Location: Left arm, BP Patient Position: At rest)    Pulse 87    Temp 98 °F (36.7 °C)    Resp 18    Ht 5' 6\" (1.676 m)    Wt 72.1 kg (158 lb 14.4 oz)    SpO2 94%  Comment: pt has COPD so O2 was removed at this time    BMI 25.65 kg/m2      Tmax/24hrs: Temp (24hrs), Av.1 °F (37.8 °C), Min:98 °F (36.7 °C), Max:102.6 °F (39.2 °C)    Intake/Output Summary (Last 24 hours) at 18 1242  Last data filed at 18 0300   Gross per 24 hour   Intake                0 ml   Output              400 ml   Net             -400 ml       General:  In NAD. Cardiovascular: RRR. Pulmonary:  Clear, no wheezes. GI:  Abdomen soft, NTTP. Extremities:  Warm, no ischemia.   Neuro:  Awake and alert, motor nonfocal.    Labs:    Recent Results (from the past 24 hour(s))   EKG, 12 LEAD, INITIAL    Collection Time: 18 10:48 PM   Result Value Ref Range    Ventricular Rate 119 BPM    Atrial Rate 119 BPM    P-R Interval 200 ms    QRS Duration 112 ms    Q-T Interval 324 ms    QTC Calculation (Bezet) 455 ms    Calculated P Axis 26 degrees    Calculated R Axis -48 degrees    Calculated T Axis 25 degrees    Diagnosis       Sinus tachycardia with premature atrial complexes in a pattern of bigeminy  Incomplete right bundle branch block  Left anterior fascicular block  Minimal voltage criteria for LVH, may be normal variant  ST elevation, consider inferior injury or acute infarct  ACUTE MI  Consider right ventricular involvement in acute inferior infarct  Abnormal ECG  When compared with ECG of 16-JAN-2017 10:39,  premature atrial complexes are now present  Vent. rate has increased BY  44 BPM  Non-specific change in ST segment in Anterior leads     EKG, 12 LEAD, SUBSEQUENT    Collection Time: 02/28/18 10:56 PM   Result Value Ref Range    Ventricular Rate 114 BPM    Atrial Rate 114 BPM    P-R Interval 200 ms    QRS Duration 110 ms    Q-T Interval 330 ms    QTC Calculation (Bezet) 454 ms    Calculated P Axis 40 degrees    Calculated R Axis -40 degrees    Calculated T Axis 12 degrees    Diagnosis       Sinus tachycardia with occasional premature ventricular complexes  Left axis deviation  Incomplete right bundle branch block  Minimal voltage criteria for LVH, may be normal variant  Abnormal ECG  When compared with ECG of 28-FEB-2018 22:48,  premature ventricular complexes are now present  premature atrial complexes are no longer present     CBC WITH AUTOMATED DIFF    Collection Time: 02/28/18 11:00 PM   Result Value Ref Range    WBC 9.9 4.6 - 13.2 K/uL    RBC 4.11 (L) 4.70 - 5.50 M/uL    HGB 13.3 13.0 - 16.0 g/dL    HCT 39.2 36.0 - 48.0 %    MCV 95.4 74.0 - 97.0 FL    MCH 32.4 24.0 - 34.0 PG    MCHC 33.9 31.0 - 37.0 g/dL    RDW 13.3 11.6 - 14.5 %    PLATELET 429 945 - 217 K/uL    MPV 9.7 9.2 - 11.8 FL    NEUTROPHILS 88 (H) 40 - 73 %    LYMPHOCYTES 5 (L) 21 - 52 %    MONOCYTES 6 3 - 10 %    EOSINOPHILS 1 0 - 5 %    BASOPHILS 0 0 - 2 %    ABS. NEUTROPHILS 8.7 (H) 1.8 - 8.0 K/UL    ABS. LYMPHOCYTES 0.5 (L) 0.9 - 3.6 K/UL    ABS. MONOCYTES 0.6 0.05 - 1.2 K/UL    ABS.  EOSINOPHILS 0.1 0.0 - 0.4 K/UL    ABS.  BASOPHILS 0.0 0.0 - 0.1 K/UL    DF AUTOMATED     METABOLIC PANEL, BASIC    Collection Time: 02/28/18 11:00 PM   Result Value Ref Range    Sodium 139 136 - 145 mmol/L    Potassium 3.8 3.5 - 5.5 mmol/L    Chloride 103 100 - 108 mmol/L    CO2 28 21 - 32 mmol/L    Anion gap 8 3.0 - 18 mmol/L    Glucose 106 (H) 74 - 99 mg/dL    BUN 12 7.0 - 18 MG/DL    Creatinine 1.08 0.6 - 1.3 MG/DL    BUN/Creatinine ratio 11 (L) 12 - 20      GFR est AA >60 >60 ml/min/1.73m2    GFR est non-AA >60 >60 ml/min/1.73m2    Calcium 8.9 8.5 - 10.1 MG/DL   CARDIAC PANEL,(CK, CKMB & TROPONIN)    Collection Time: 02/28/18 11:00 PM   Result Value Ref Range    CK 51 39 - 308 U/L    CK - MB <1.0 <3.6 ng/ml    CK-MB Index  0.0 - 4.0 %     CALCULATION NOT PERFORMED WHEN RESULT IS BELOW LINEAR LIMIT    Troponin-I, Qt. <0.02 0.0 - 0.045 NG/ML   CULTURE, BLOOD    Collection Time: 02/28/18 11:00 PM   Result Value Ref Range    Special Requests: NO SPECIAL REQUESTS      Culture result: NO GROWTH AFTER 7 HOURS     NT-PRO BNP    Collection Time: 02/28/18 11:00 PM   Result Value Ref Range    NT pro- 0 - 1800 PG/ML   URINALYSIS W/ RFLX MICROSCOPIC    Collection Time: 02/28/18 11:15 PM   Result Value Ref Range    Color YELLOW      Appearance CLEAR      Specific gravity 1.019 1.005 - 1.030      pH (UA) 7.5 5.0 - 8.0      Protein NEGATIVE  NEG mg/dL    Glucose NEGATIVE  NEG mg/dL    Ketone NEGATIVE  NEG mg/dL    Bilirubin NEGATIVE  NEG      Blood NEGATIVE  NEG      Urobilinogen 0.2 0.2 - 1.0 EU/dL    Nitrites NEGATIVE  NEG      Leukocyte Esterase NEGATIVE  NEG     CULTURE, BLOOD    Collection Time: 02/28/18 11:15 PM   Result Value Ref Range    Special Requests: NO SPECIAL REQUESTS      Culture result: NO GROWTH AFTER 7 HOURS     POC LACTIC ACID    Collection Time: 02/28/18 11:16 PM   Result Value Ref Range    Lactic Acid (POC) 2.9 (HH) 0.4 - 2.0 mmol/L   POC LACTIC ACID    Collection Time: 03/01/18  1:59 AM   Result Value Ref Range Lactic Acid (POC) 2.4 (HH) 0.4 - 2.0 mmol/L   INFLUENZA A & B AG (RAPID TEST)    Collection Time: 03/01/18  4:30 AM   Result Value Ref Range    Influenza A Antigen NEGATIVE  NEG      Influenza B Antigen NEGATIVE  NEG     LACTIC ACID    Collection Time: 03/01/18  5:35 AM   Result Value Ref Range    Lactic acid 1.4 0.4 - 2.0 MMOL/L       Signed By: Irving Lazo MD     March 1, 2018 10:23 AM

## 2018-03-01 NOTE — ROUTINE PROCESS
Bedside and Verbal shift change report given to Edward Mac RN (oncoming nurse) by Shelley Xavier RN   (offgoing nurse). Report included the following information SBAR, Kardex, Intake/Output, MAR and Recent Results.

## 2018-03-02 ENCOUNTER — HOME HEALTH ADMISSION (OUTPATIENT)
Dept: HOME HEALTH SERVICES | Facility: HOME HEALTH | Age: 73
End: 2018-03-02

## 2018-03-02 VITALS
TEMPERATURE: 97.7 F | BODY MASS INDEX: 24.49 KG/M2 | RESPIRATION RATE: 20 BRPM | HEIGHT: 66 IN | DIASTOLIC BLOOD PRESSURE: 65 MMHG | OXYGEN SATURATION: 98 % | HEART RATE: 76 BPM | SYSTOLIC BLOOD PRESSURE: 124 MMHG | WEIGHT: 152.4 LBS

## 2018-03-02 LAB
T4 FREE SERPL-MCNC: 1 NG/DL (ref 0.7–1.5)
TSH SERPL DL<=0.05 MIU/L-ACNC: 0.8 UIU/ML (ref 0.36–3.74)

## 2018-03-02 PROCEDURE — 74011250637 HC RX REV CODE- 250/637: Performed by: FAMILY MEDICINE

## 2018-03-02 PROCEDURE — 74011000250 HC RX REV CODE- 250: Performed by: EMERGENCY MEDICINE

## 2018-03-02 PROCEDURE — 74011250637 HC RX REV CODE- 250/637: Performed by: INTERNAL MEDICINE

## 2018-03-02 PROCEDURE — 84443 ASSAY THYROID STIM HORMONE: CPT | Performed by: FAMILY MEDICINE

## 2018-03-02 PROCEDURE — 74011250636 HC RX REV CODE- 250/636: Performed by: INTERNAL MEDICINE

## 2018-03-02 PROCEDURE — 84439 ASSAY OF FREE THYROXINE: CPT | Performed by: FAMILY MEDICINE

## 2018-03-02 PROCEDURE — 74011250636 HC RX REV CODE- 250/636: Performed by: EMERGENCY MEDICINE

## 2018-03-02 PROCEDURE — 36415 COLL VENOUS BLD VENIPUNCTURE: CPT | Performed by: FAMILY MEDICINE

## 2018-03-02 PROCEDURE — 94640 AIRWAY INHALATION TREATMENT: CPT

## 2018-03-02 RX ORDER — CEFTRIAXONE 2 G/1
INJECTION, POWDER, FOR SOLUTION INTRAMUSCULAR; INTRAVENOUS
Status: DISCONTINUED
Start: 2018-03-02 | End: 2018-03-02 | Stop reason: HOSPADM

## 2018-03-02 RX ORDER — AZITHROMYCIN 250 MG/1
250 TABLET, FILM COATED ORAL DAILY
Qty: 3 TAB | Refills: 0 | Status: SHIPPED | OUTPATIENT
Start: 2018-03-02 | End: 2018-03-05

## 2018-03-02 RX ADMIN — METHYLPREDNISOLONE SODIUM SUCCINATE 60 MG: 125 INJECTION, POWDER, FOR SOLUTION INTRAMUSCULAR; INTRAVENOUS at 05:40

## 2018-03-02 RX ADMIN — APIXABAN 5 MG: 5 TABLET, FILM COATED ORAL at 08:53

## 2018-03-02 RX ADMIN — FUROSEMIDE 40 MG: 10 INJECTION, SOLUTION INTRAMUSCULAR; INTRAVENOUS at 08:53

## 2018-03-02 RX ADMIN — ASPIRIN 81 MG: 81 TABLET, COATED ORAL at 08:53

## 2018-03-02 RX ADMIN — LEVOTHYROXINE SODIUM 50 MCG: 25 TABLET ORAL at 08:52

## 2018-03-02 RX ADMIN — WATER 2 G: 1 INJECTION INTRAMUSCULAR; INTRAVENOUS; SUBCUTANEOUS at 01:50

## 2018-03-02 RX ADMIN — FLUTICASONE FUROATE AND VILANTEROL TRIFENATATE 1 PUFF: 200; 25 POWDER RESPIRATORY (INHALATION) at 09:30

## 2018-03-02 RX ADMIN — Medication 10 ML: at 05:40

## 2018-03-02 RX ADMIN — METOPROLOL TARTRATE 50 MG: 50 TABLET ORAL at 08:53

## 2018-03-02 NOTE — DISCHARGE INSTRUCTIONS

## 2018-03-02 NOTE — DISCHARGE SUMMARY
Discharge Summary    Patient: Corie Duenas MRN: [id number]  CSN: [id number]    YOB: 1945  Age: 74 y.o. Sex: male    DOA: 2/28/2018 LOS:  LOS: 1 day   Discharge Date: 3/2/2018     Admission Diagnoses:   Fever    Discharge Diagnoses:    Fever/chills, suspect viral syndrome  Pulmonary fibrosis  Atrial fibrillation on chronic anticoagulation therapy (Eliquis)  Hypothyroidism      Discharge Condition: Stable    PHYSICAL EXAM  Visit Vitals    /65 (BP 1 Location: Left arm, BP Patient Position: At rest)    Pulse 76    Temp 97.7 °F (36.5 °C)    Resp 20    Ht 5' 6\" (1.676 m)    Wt 69.1 kg (152 lb 6.4 oz)    SpO2 91%    BMI 24.6 kg/m2       General: In NAD. HEENT: NCAT. Sclerae anicteric. Lungs:  Clear, no wheezes. Heart:  S1, S2. Rate WNL. Abdomen: Soft, Non distended, Non tender.  +Bowel sounds. Extremities: Warm, no ischemia or edema. Psych:   Good insight. Not anxious or agitated. Neurologic:  Awake and alert, motor grossly nonfocal.      Hospital Course:   See admission H&P for full details of HPI. Patient was admitted to a telemetry bed due to concern for possible pneumonia. Antibiotic therapy was initiated empirically, but it is ultimately suspected that patient most likely had a viral URI versus possible bronchitis - pneumonia is felt to be ruled out. He was negative for influenza and was not treated for this. Cardiology evaluated patient while hospitalized but had no acute recommendations as he was stable from a cardiac perspective. Patient is medically stable for discharge home with outpatient follow up as advised. Consults:   CSI    Significant Diagnostic Studies:   CXR:  IMPRESSION:     No acute findings. Stable interstitial fibrosis. Discharge Medications:     Current Discharge Medication List      START taking these medications    Details   azithromycin (ZITHROMAX) 250 mg tablet Take 1 Tab by mouth daily for 3 days.   Qty: 3 Tab, Refills: 0 CONTINUE these medications which have NOT CHANGED    Details   albuterol (PROVENTIL HFA, VENTOLIN HFA, PROAIR HFA) 90 mcg/actuation inhaler Take 2 Puffs by inhalation every four (4) hours as needed for Wheezing or Shortness of Breath. Ventolin HFA      aspirin delayed-release 81 mg tablet Take  by mouth daily. benzonatate (TESSALON) 200 mg capsule Take 200 mg by mouth three (3) times daily as needed for Cough. Associated Diagnoses: Acute bilateral ankle pain      BREO ELLIPTA 200-25 mcg/dose inhaler Take 1 Puff by inhalation daily. metoprolol tartrate (LOPRESSOR) 50 mg tablet Take 1 Tab by mouth two (2) times a day. Qty: 60 Tab, Refills: 6    Associated Diagnoses: New onset a-fib (HCC)      apixaban (ELIQUIS) 5 mg tablet Take 1 Tab by mouth two (2) times a day. Qty: 60 Tab, Refills: 6      levothyroxine (SYNTHROID) 50 mcg tablet Take 50 mcg by mouth daily. LORazepam (ATIVAN) 1 mg tablet Take  by mouth every six (6) hours as needed. simvastatin (ZOCOR) 80 mg tablet Take 80 mg by mouth nightly. Associated Diagnoses: Hypercholesterolemia      omeprazole (PRILOSEC OTC) 20 mg tablet Take 20 mg by mouth as needed. zolpidem (AMBIEN) 10 mg tablet Take 12.5 mg by mouth nightly. coenzyme q10 (CO Q-10) 10 mg Cap Take 100 mg by mouth daily. nitroglycerin (NITROSTAT) 0.4 mg SL tablet 1 Tab by SubLINGual route every five (5) minutes as needed for Chest Pain. Qty: 25 Tab, Refills: 3      cetirizine (ZYRTEC) 10 mg tablet Take  by mouth as needed. Activity: As tolerated    Diet: Cardiac. Follow-up: with PCP, Miguel Escobar NP in 1 week. Tessa Nowak.  Narinder Galvan MD  [facility]

## 2018-03-02 NOTE — PROGRESS NOTES
8517 Report received from night nurse. Patient up ambulating about room at this time, gait steady. Denies pain or discomfort. C/L in reach, safety maintained. Will monitor. 1152 Discharge instructions and printed education given to patient. Both IV sites removed cannula intact. Tele monitor removed tele tech notified. Waiting for ride at this time. Denies pain or discomfort. No acute distress noted. Patient reports feeling much better.

## 2018-03-02 NOTE — PROGRESS NOTES
Care Management Interventions  PCP Verified by CM: Yes [facility] NP)  Mode of Transport at Discharge: Other (see comment) (Wife, Aron Zavala, to provide transportation home)  Current Support Network: Lives with Spouse  Confirm Follow Up Transport: Self  Discharge Location  Discharge Placement: Home     Wife Christel Powell - [phone]    Pt admitted with pneumonia. Reports complete independence at home. Discussed H2H with pt. Pt agreeable to [facility]. Referral submitted to [facility]. Wife to provide transportation home. Pt denies any CM/HH needs at this time.

## 2018-03-02 NOTE — ROUTINE PROCESS
Bedside and Verbal shift change report given to Florentino Lanier RN   (oncoming nurse) by Sandy Nguyễn RN (offgoing nurse). Report included the following information SBAR, Kardex, Intake/Output, MAR and Recent Results. Resting in bed watching tv. Denies pain or needs at present. Call bell in reach. 0730 Bedside and Verbal shift change report given to Demetrio Duggan RN (oncoming nurse) by Florentino Lanier RN   (offgoing nurse). Report included the following information SBAR, Kardex, Intake/Output, MAR and Recent Results.

## 2018-03-05 ENCOUNTER — HOME CARE VISIT (OUTPATIENT)
Dept: SCHEDULING | Facility: HOME HEALTH | Age: 73
End: 2018-03-05

## 2018-03-06 LAB
BACTERIA SPEC CULT: NORMAL
BACTERIA SPEC CULT: NORMAL
SERVICE CMNT-IMP: NORMAL
SERVICE CMNT-IMP: NORMAL

## 2018-04-23 ENCOUNTER — OFFICE VISIT (OUTPATIENT)
Dept: CARDIOLOGY CLINIC | Age: 73
End: 2018-04-23

## 2018-04-23 VITALS
BODY MASS INDEX: 24.59 KG/M2 | OXYGEN SATURATION: 98 % | HEART RATE: 72 BPM | HEIGHT: 66 IN | SYSTOLIC BLOOD PRESSURE: 140 MMHG | WEIGHT: 153 LBS | DIASTOLIC BLOOD PRESSURE: 64 MMHG

## 2018-04-23 DIAGNOSIS — I25.10 ATHEROSCLEROSIS OF NATIVE CORONARY ARTERY OF NATIVE HEART WITHOUT ANGINA PECTORIS: ICD-10-CM

## 2018-04-23 DIAGNOSIS — R06.09 DOE (DYSPNEA ON EXERTION): ICD-10-CM

## 2018-04-23 DIAGNOSIS — I51.9 DIASTOLIC DYSFUNCTION, LEFT VENTRICLE: ICD-10-CM

## 2018-04-23 DIAGNOSIS — G47.33 SLEEP APNEA, OBSTRUCTIVE: ICD-10-CM

## 2018-04-23 DIAGNOSIS — I48.0 PAROXYSMAL ATRIAL FIBRILLATION (HCC): Primary | ICD-10-CM

## 2018-04-23 DIAGNOSIS — J84.10 PULMONARY FIBROSIS (HCC): ICD-10-CM

## 2018-04-23 RX ORDER — METOPROLOL TARTRATE 50 MG/1
50 TABLET ORAL 2 TIMES DAILY
Qty: 60 TAB | Refills: 6 | Status: SHIPPED | OUTPATIENT
Start: 2018-04-23

## 2018-04-23 RX ORDER — DILTIAZEM HYDROCHLORIDE 120 MG/1
CAPSULE, COATED, EXTENDED RELEASE ORAL DAILY
COMMUNITY
End: 2018-04-23 | Stop reason: ALTCHOICE

## 2018-04-23 NOTE — PROGRESS NOTES
HISTORY OF PRESENT ILLNESS  April Lynn is a 74 y.o. male. HPI  He has had recurrent bouts of pneumonia and was hospitalized the latter part of February of 2018. He was found to have rapid atrial fibrillation and was put on the diltiazem in place of metoprolol because of active wheezing. He has since converted to sinus rhythm. He continues to have occasional palpitations but no prolonged palpitations. He has had no chest pain, resting dyspnea, orthopnea or PND. Lately, he has not had any wheezing. He has had no .syncope. He denies any symptoms to indicate TIA or amaurosis fugax. He had repeat echocardiogram on 12/22/17 which demonstrated lower limit of normal LV function with EF in the 50-55% range and evidence grade 1 diastolic dysfunction parameters. The right ventricle was mildly dilated and the systolic function was mildly reduced. The left atrial dimension was normal with a volume index of 25 mL/m2 and the right atrial dimension was at the upper limit of normal. There was doppler evidence of moderate regurgitation of the aortic valve but no aortic stenosis. There is no mitral valvular pathology. He had a Holter monitor on 12/12/17 which demonstrated sinus rhythm. There are frequent PVCs and 9 short runs of nonsustained SVT, the longest 4-5 beats.      He has a history of CABG X4 in the latter part of June 1996, which consisted of:    1. Single SVG to the distal LAD. 2. Sequential SVG to the proximal LAD and intermediate marginal branch. 3. Single SVG to the distal RCA. Because of an abnormal thallium myocardial scanning, he underwent a cardiac catheterization on December 4, 2001, which demonstrated total occlusion of the vein graft to the distal LAD but patent sequential SVG to the proximal LAD and intermediate marginal branch. The SVG to the distal RCA was totally occluded; however, the RCA was still open with a 90% stenosis. It was successfully angioplastied and stented.  His LV function was mildly diminished with an EF in the range of 50% or less.    Because of the palpitations and skipping, he underwent a 24-hour Holter monitor on February 18, 2004, which demonstrated some frequent atrial ectopies and intermittent short runs of atrial tachycardia and fibrillation and rare PVCs. He was advised to take a beta blocker, but he has been very reluctant to take a beta blocker because of fear of sexual dysfunction. He has had no sustained tachycardia thus far. Because of recurrent chest pain, he underwent cardiac catheterization on March 7, 2005, which demonstrated:    1. 60% stenosis of the left main trunk.    2. Total occlusion of the LAD in the proximal segment.    3. Severe, diffuse disease of the circumflex artery, with total occlusion.    4. 80% stenosis of the ostium of the ramus intermedius.    5. Patent dominant right coronary artery, with 10% stenosis.    6. Patent SVG to the proximal LAD, but total occlusion of the vein graft to the distal LAD.    7. Total occlusion of the vein graft to the distal RCA.    8. Normal left ventricular systolic function, with EF in the 60% range.    It was felt that he could be best treated medically. He underwent stress nuclear cardiac imaging on May 7, 2007, at which time he was able to exercise 9-minutes, with no chest pain or EKG changes. His nuclear imaging demonstrated mild scarring and ischemia in the inferior wall. The ejection fraction was estimated in the 72% range. He had repeat stress nuclear cardiac imaging on August 20, 2009 at which time he was able to exercise 9 minutes and 35 seconds with no chest pain or EKG changes.  Perfusion imaging was essentially unchanged in comparison to the one from May 7, 2007.    He had repeat stress test as a Lexiscan Cardiolite myocardial perfusion imaging on 8/2/11 which was essentially unchanged and negative.    His follow up stress nuclear cardiac imaging on 7/3/2012 demonstrated normal perfusion with no evidence of scaring or ischemia. The ejection fraction was estimated in the 70% range.    Because of the continued dyspnea on exertion, he had repeat cardiac catheterization on 12/6/12 to rule out any atypical presentation of angina. His coronary angiogram demonstrated new 60-85% diffuse narrowing in the native LAD at the insertion of the vein graft to the proximal LAD. The remainder of the coronary anatomy was unchanged and there was no evidence of restenosis of the stented right coronary artery. LVEDP was up to 18 mmHg. The left ventricular ejection fraction was in the 60% range.    The native LAD was subsequently stented with the 2.25 x 30 mm Resolute stent successfully.    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 His CT scan of the chest demonstrated evidence of COPD with emphysema in the upper lobes and mosaic attenuation which is widespread and likely secondary to small airway disease. There was also some bronchial thickening and mild cylindrical bronchiectasis. He was subsequently referred to a pulmonologist who advised him that he has a chronic bronchitis. He was started on inhalers with very little help.    He underwent stress nuclear cardiac imaging for follow up on 1/28/15, which demonstrated normal perfusion with no evidence of scarring or ischemia. The ejection fraction was in the 70% range.   He underwent followup stress nuclear cardiac imaging on 01/16/2017, which demonstrated a small area of fixed perfusion defect in the basal inferior wall most likely related to diaphragmatic attenuation with no other perfusion defect.  Ejection fraction was in the range of 65%. He was seen on 12/5/17 at which time he was found to have new onset atrial fibrillation and evidence of heart failure with elevated proBNP level.  He was treated with Lasix and has been treated with rate controlling medication and anticoagulation with Eliquis. Review of Systems   Constitutional: Negative for malaise/fatigue and weight loss. HENT: Negative for hearing loss. Eyes: Negative for blurred vision and double vision. Respiratory: Positive for shortness of breath. Cardiovascular: Positive for palpitations and leg swelling. Negative for chest pain, orthopnea, claudication and PND. Gastrointestinal: Negative for blood in stool, heartburn and melena. Genitourinary: Positive for frequency. Negative for dysuria, hematuria and urgency. Musculoskeletal: Negative for back pain and joint pain. Skin: Negative for itching and rash. Neurological: Negative for dizziness, loss of consciousness and weakness. Psychiatric/Behavioral: Negative for depression and memory loss. Physical Exam   Constitutional: He is oriented to person, place, and time. He appears well-developed and well-nourished. HENT:   Head: Normocephalic and atraumatic. Eyes: Conjunctivae are normal. Pupils are equal, round, and reactive to light. Neck: Normal range of motion. Neck supple. No JVD present. Cardiovascular: Normal rate, regular rhythm, S1 normal and S2 normal.   No extrasystoles are present. PMI is not displaced. Exam reveals no gallop and no friction rub. No murmur heard. Pulses:       Carotid pulses are 3+ on the right side, and 3+ on the left side. Pulmonary/Chest: Effort normal. He has rales. Abdominal: Soft. There is no tenderness. Musculoskeletal: He exhibits edema. Neurological: He is alert and oriented to person, place, and time. No cranial nerve deficit. Skin: Skin is warm and dry. Psychiatric: He has a normal mood and affect.  His behavior is normal.     Visit Vitals    /64 (BP 1 Location: Right arm, BP Patient Position: Sitting)    Pulse 72    Ht 5' 6\" (1.676 m)    Wt 69.4 kg (153 lb)    SpO2 98%    BMI 24.69 kg/m2       Past Medical History:   Diagnosis Date    Arrhythmia     Bronchitis 6/2013     CABG x 4 2001    CAD (coronary artery disease)     Cardiac cath 12/06/2012    RCA prev stent patent. LM severe but patent (supplies pLAD & CX). dLAD 60-85% (2.25 x 30-mm Resolute stent, resid 0%). SVG to % (known). SVG to dLAD 100% (known). Seq SVG to pLAD & RI patent. LVEDP 18.  EF 60%. Poss mild mid anterior hypk.  Cardiac nuclear imaging test, low risk 01/16/2017    Low risk. Sm basal inferior fixed defect, likely artifact. No ischemia. No WMA. EF 65%. Neg EKG on pharm stress test.  Unchanged from study of 1/28/15.  Chest pain     atypical    Chronic lung disease     Chronic obstructive pulmonary disease (HCC)     Coronary artery disease     Status post CABG x 4, with stenting of the native RCA in Dec 2001/ EF 60%    HAYWARD (dyspnea on exertion)     Elevated liver enzymes     mild    Hypercholesterolemia     Hypertension     Palpitations     Pulmonary fibrosis ([address]     PVC's (premature ventricular contractions)     Unspecified sleep apnea     wife stated pt's doctor aware  is unable to jazmin use of cpap    Vertigo     mild       Social History     Social History    Marital status:      Spouse name: N/A    Number of children: N/A    Years of education: N/A     Occupational History    Not on file.      Social History Main Topics    Smoking status: Never Smoker    Smokeless tobacco: Never Used    Alcohol use Yes      Comment: 10 drinks per week    Drug use: No    Sexual activity: Not on file     Other Topics Concern    Not on file     Social History Narrative       Family History   Problem Relation Age of Onset    Hypertension Brother     Arthritis-osteo Brother     Cancer Mother      bone and breast cancer       Past Surgical History:   Procedure Laterality Date    HAND/FINGER SURGERY UNLISTED  11-16-11    right    HX ANGIOPLASTY  12/2001, 2012    with stenting of native RCA    HX CHOLECYSTECTOMY  2010    HX CORONARY ARTERY BYPASS GRAFT  6/1996    x 4; Single SVG to distal LAD; sequential SVG to proximal LAD & intermediate marginal branch; single SVG to distal RCA    HX HEENT      CATARACT    HX HERNIA REPAIR  2007       Current Outpatient Prescriptions   Medication Sig Dispense Refill    dilTIAZem CD (CARTIA XT) 120 mg ER capsule Take  by mouth daily.  albuterol (PROVENTIL HFA, VENTOLIN HFA, PROAIR HFA) 90 mcg/actuation inhaler Take 2 Puffs by inhalation every four (4) hours as needed for Wheezing or Shortness of Breath. Ventolin HFA      aspirin delayed-release 81 mg tablet Take  by mouth daily.  benzonatate (TESSALON) 200 mg capsule Take 200 mg by mouth three (3) times daily as needed for Cough.  BREO ELLIPTA 200-25 mcg/dose inhaler Take 1 Puff by inhalation daily.  apixaban (ELIQUIS) 5 mg tablet Take 1 Tab by mouth two (2) times a day. 60 Tab 6    levothyroxine (SYNTHROID) 50 mcg tablet Take 75 mcg by mouth daily.  LORazepam (ATIVAN) 1 mg tablet Take  by mouth every six (6) hours as needed.  simvastatin (ZOCOR) 80 mg tablet Take 80 mg by mouth nightly. Indications: Patient is taking half a tab      nitroglycerin (NITROSTAT) 0.4 mg SL tablet 1 Tab by SubLINGual route every five (5) minutes as needed for Chest Pain. 25 Tab 3    omeprazole (PRILOSEC OTC) 20 mg tablet Take 20 mg by mouth as needed.  cetirizine (ZYRTEC) 10 mg tablet Take  by mouth as needed.  zolpidem (AMBIEN) 10 mg tablet Take 12.5 mg by mouth nightly.  coenzyme q10 (CO Q-10) 10 mg Cap Take 100 mg by mouth daily.  metoprolol tartrate (LOPRESSOR) 50 mg tablet Take 1 Tab by mouth two (2) times a day. 60 Tab 6       EKG: unchanged from previous tracings, normal sinus rhythm, RBBB, left axis deviation. ASSESSMENT and PLAN  Encounter Diagnoses   Name Primary?     Paroxysmal atrial fibrillation (HCC) Yes    Pulmonary fibrosis (HCC)     Sleep apnea, obstructive     HAYWARD (dyspnea on exertion)     Diastolic dysfunction, left ventricle     CAD without angina pectoris,status post CABG X4 in June 1996, with stenting of the native RCA in December 2001/ EF 60%; s/p stenting in the LAD 12/6/12    He has a vicious cycle of exacerbation of pulmonary fibrosis with the respiratory failure, the onset of atrial fibrillation and worsening left ventricular diastolic dysfunction and worsening of respiratory failure and in turn worsening pulmonary status. He needs to be diuresed each time when he has a flare of respiratory failure to prevent or improve diastolic heart failure. We may consider atrial fibrillation ablation treatment particularly in light of the normal left atrial dimension. However, because of his pulmonary status, he may have a higher instance of recurrence. A clearcut etiology of his pulmonary fibrosis has not been identified and there is some discussion of lung biopsy which was not carried out by his pulmonologist because of the fact that he has been on anticoagulation. If a lung biopsy is planned, the Eliquis could be withheld for a few days for a lung biopsy.

## 2018-04-23 NOTE — PROGRESS NOTES
1. Have you been to the ER, urgent care clinic since your last visit? Hospitalized since your last visit? No    2. Have you seen or consulted any other health care providers outside of the [address] [place] [address] since your last visit? Include any pap smears or colon screening.  No

## 2018-04-23 NOTE — MR AVS SNAPSHOT
[address] [address] [address] [address] [phone] Patient: Melva Crum MRN: [id number] NEM:[id number] Visit Information Date & Time Provider Department Dept. Phone Encounter #  
 4/23/2018 10:00 AM Ashley Spaulding MD [facility] [id number] Upcoming Health Maintenance Date Due ZOSTER VACCINE AGE 60> 11/26/2002 GLAUCOMA SCREENING Q2Y 1/26/2008 Pneumococcal 65+ Low/Medium Risk (1 of 2 - PCV13) 1/26/2008 MEDICARE YEARLY EXAM 3/14/2018 DTaP/Tdap/Td series (2 - Td) 10/6/2027 Allergies as of 4/23/2018  Review Complete On: 4/23/2018 By: Ashley Spaulding MD  
  
 Severity Noted Reaction Type Reactions Levofloxacin Medium 12/26/2017   Side Effect Swelling Lipitor [Atorvastatin]  03/06/2012    Other (comments) Joint pain Current Immunizations  Never Reviewed Name Date Tdap 10/6/2017  3:17 PM  
  
 Not reviewed this visit You Were Diagnosed With   
  
 Codes Comments Paroxysmal atrial fibrillation (HCC)    -  Primary ICD-10-CM: I48.0 ICD-9-CM: 427.31 Pulmonary fibrosis ([facility]     ICD-10-CM: J84.10 ICD-9-CM: 364 Sleep apnea, obstructive     ICD-10-CM: G47.33 
ICD-9-CM: 327.23   
 HAYWARD (dyspnea on exertion)     ICD-10-CM: R06.09 
ICD-9-CM: 786.09 Diastolic dysfunction, left ventricle     ICD-10-CM: I51.9 ICD-9-CM: 429.9 New onset a-fib [facility])     ICD-10-CM: I48.91 
ICD-9-CM: 427.31 Vitals BP Pulse Height(growth percentile) Weight(growth percentile) SpO2 BMI  
 140/64 (BP 1 Location: Right arm, BP Patient Position: Sitting) 72 5' 6\" (1.676 m) 153 lb (69.4 kg) 98% 24.69 kg/m2 Smoking Status Never Smoker Vitals History BMI and BSA Data Body Mass Index Body Surface Area  
 24.69 kg/m 2 1.8 m 2 Preferred Pharmacy Pharmacy Name Phone [facility] [phone] [facility] [phone] Your Updated Medication List  
  
   
This list is accurate as of 4/23/18 11:49 AM.  Always use your most recent med list.  
  
  
  
  
 albuterol 90 mcg/actuation inhaler Commonly known as:  PROVENTIL HFA, VENTOLIN HFA, PROAIR HFA Take 2 Puffs by inhalation every four (4) hours as needed for Wheezing or Shortness of Breath. Ventolin HFA AMBIEN 10 mg tablet Generic drug:  zolpidem Take 12.5 mg by mouth nightly. apixaban 5 mg tablet Commonly known as:  Tee Sayer Take 1 Tab by mouth two (2) times a day. aspirin delayed-release 81 mg tablet Take  by mouth daily. benzonatate 200 mg capsule Commonly known as:  TESSALON Take 200 mg by mouth three (3) times daily as needed for Cough. BREO ELLIPTA 200-25 mcg/dose inhaler Generic drug:  fluticasone-vilanterol Take 1 Puff by inhalation daily. CO Q-10 10 mg Cap Generic drug:  coenzyme q10 Take 100 mg by mouth daily. levothyroxine 50 mcg tablet Commonly known as:  SYNTHROID Take 75 mcg by mouth daily. LORazepam 1 mg tablet Commonly known as:  ATIVAN Take  by mouth every six (6) hours as needed. metoprolol tartrate 50 mg tablet Commonly known as:  LOPRESSOR Take 1 Tab by mouth two (2) times a day. nitroglycerin 0.4 mg SL tablet Commonly known as:  NITROSTAT  
1 Tab by SubLINGual route every five (5) minutes as needed for Chest Pain. PriLOSEC OTC 20 mg tablet Generic drug:  omeprazole Take 20 mg by mouth as needed. ZOCOR 80 mg tablet Generic drug:  simvastatin Take 80 mg by mouth nightly. Indications: Patient is taking half a tab ZyrTEC 10 mg tablet Generic drug:  cetirizine Take  by mouth as needed. We Performed the Following AMB POC EKG ROUTINE W/ 12 LEADS, INTER & REP [75148 CPT(R)] Patient Instructions Restart metoprolol 50 mg one tablet twice dialy Stop cartia Introducing [facility]! [facility] introduces [employer] patient portal. Now you can access parts of your medical record, email your doctor's office, and request medication refills online. 1. In your internet browser, go to [email or web address] 2. Click on the First Time User? Click Here link in the Sign In box. You will see the New Member Sign Up page. 3. Enter your Yappn Access Code exactly as it appears below. You will not need to use this code after youve completed the sign-up process. If you do not sign up before the expiration date, you must request a new code. · Yapp[employer] Access Code: [email or web address] Expires: 6/3/2018  6:32 PM 
 
4. Enter the last four digits of your Social Security Number (xxxx) and Date of Birth (mm/dd/yyyy) as indicated and click Submit. You will be taken to the next sign-up page. 5. Create a Yappn ID. This will be your Yappn login ID and cannot be changed, so think of one that is secure and easy to remember. 6. Create a Yappn password. You can change your password at any time. 7. Enter your Password Reset Question and Answer. This can be used at a later time if you forget your password. 8. Enter your e-mail address. You will receive e-mail notification when new information is available in [address] 9. Click Sign Up. You can now view and download portions of your medical record. 10. Click the Download Summary menu link to download a portable copy of your medical information. If you have questions, please visit the Frequently Asked Questions section of the Yappn website. Remember, Yappn is NOT to be used for urgent needs. For medical emergencies, dial 911. Now available from your iPhone and Android! Please provide this summary of care documentation to your next provider. Your primary care clinician is listed as [facility].  If you have any questions after today's visit, please call [phone].

## 2018-04-24 ENCOUNTER — HOSPITAL ENCOUNTER (OUTPATIENT)
Dept: LAB | Age: 73
Discharge: HOME | End: 2018-04-24
Payer: MEDICARE

## 2018-04-24 LAB
ANION GAP SERPL CALC-SCNC: 8 MMOL/L (ref 3–18)
BASOPHILS # BLD: 0 K/UL (ref 0–0.06)
BASOPHILS NFR BLD: 0 % (ref 0–2)
BUN SERPL-MCNC: 13 MG/DL (ref 7–18)
BUN/CREAT SERPL: 14 (ref 12–20)
CALCIUM SERPL-MCNC: 9 MG/DL (ref 8.5–10.1)
CHLORIDE SERPL-SCNC: 107 MMOL/L (ref 100–108)
CO2 SERPL-SCNC: 28 MMOL/L (ref 21–32)
CREAT SERPL-MCNC: 0.96 MG/DL (ref 0.6–1.3)
DIFFERENTIAL METHOD BLD: ABNORMAL
EOSINOPHIL # BLD: 0.1 K/UL (ref 0–0.4)
EOSINOPHIL NFR BLD: 2 % (ref 0–5)
ERYTHROCYTE [DISTWIDTH] IN BLOOD BY AUTOMATED COUNT: 12.9 % (ref 11.6–14.5)
GLUCOSE SERPL-MCNC: 130 MG/DL (ref 74–99)
HCT VFR BLD AUTO: 38.7 % (ref 36–48)
HGB BLD-MCNC: 13.3 G/DL (ref 13–16)
INR PPP: 1 (ref 0.8–1.2)
LYMPHOCYTES # BLD: 1.4 K/UL (ref 0.9–3.6)
LYMPHOCYTES NFR BLD: 17 % (ref 21–52)
MCH RBC QN AUTO: 32.3 PG (ref 24–34)
MCHC RBC AUTO-ENTMCNC: 34.4 G/DL (ref 31–37)
MCV RBC AUTO: 93.9 FL (ref 74–97)
MONOCYTES # BLD: 1.1 K/UL (ref 0.05–1.2)
MONOCYTES NFR BLD: 14 % (ref 3–10)
NEUTS SEG # BLD: 5.7 K/UL (ref 1.8–8)
NEUTS SEG NFR BLD: 67 % (ref 40–73)
PLATELET # BLD AUTO: 225 K/UL (ref 135–420)
PMV BLD AUTO: 9.4 FL (ref 9.2–11.8)
POTASSIUM SERPL-SCNC: 4 MMOL/L (ref 3.5–5.5)
PROTHROMBIN TIME: 12.4 SEC (ref 11.5–15.2)
RBC # BLD AUTO: 4.12 M/UL (ref 4.7–5.5)
SODIUM SERPL-SCNC: 143 MMOL/L (ref 136–145)
WBC # BLD AUTO: 8.4 K/UL (ref 4.6–13.2)

## 2018-04-24 PROCEDURE — 85610 PROTHROMBIN TIME: CPT | Performed by: OTOLARYNGOLOGY

## 2018-04-24 PROCEDURE — 80048 BASIC METABOLIC PNL TOTAL CA: CPT | Performed by: OTOLARYNGOLOGY

## 2018-04-24 PROCEDURE — 85025 COMPLETE CBC W/AUTO DIFF WBC: CPT | Performed by: OTOLARYNGOLOGY

## 2018-04-24 PROCEDURE — 36415 COLL VENOUS BLD VENIPUNCTURE: CPT | Performed by: OTOLARYNGOLOGY

## 2018-06-04 ENCOUNTER — HOSPITAL ENCOUNTER (OUTPATIENT)
Dept: LAB | Age: 73
Discharge: HOME | End: 2018-06-04
Payer: MEDICARE

## 2018-06-04 LAB — ERYTHROCYTE [SEDIMENTATION RATE] IN BLOOD: 36 MM/HR (ref 0–20)

## 2018-06-04 PROCEDURE — 82103 ALPHA-1-ANTITRYPSIN TOTAL: CPT | Performed by: INTERNAL MEDICINE

## 2018-06-04 PROCEDURE — 86480 TB TEST CELL IMMUN MEASURE: CPT | Performed by: INTERNAL MEDICINE

## 2018-06-04 PROCEDURE — 85652 RBC SED RATE AUTOMATED: CPT | Performed by: INTERNAL MEDICINE

## 2018-06-04 PROCEDURE — 86200 CCP ANTIBODY: CPT | Performed by: INTERNAL MEDICINE

## 2018-06-04 PROCEDURE — 36415 COLL VENOUS BLD VENIPUNCTURE: CPT | Performed by: INTERNAL MEDICINE

## 2018-06-04 PROCEDURE — 82164 ANGIOTENSIN I ENZYME TEST: CPT | Performed by: INTERNAL MEDICINE

## 2018-06-04 PROCEDURE — 86606 ASPERGILLUS ANTIBODY: CPT | Performed by: INTERNAL MEDICINE

## 2018-06-05 LAB
A1AT SERPL-MCNC: 146 MG/DL (ref 90–200)
ACE SERPL-CCNC: 35 U/L (ref 14–82)
CCP IGA+IGG SERPL IA-ACNC: 6 UNITS (ref 0–19)

## 2018-06-07 LAB
A FUMIGATUS1 AB SER QL ID: NEGATIVE
A PULLULANS AB SER QL: NEGATIVE
ANNOTATION COMMENT IMP: NORMAL
LACEYELLA SACCHARI AB SER QL: NEGATIVE
M TB IFN-G CD4+ BCKGRND COR BLD-ACNC: 0.04 IU/ML
M TB IFN-G CD4+ T-CELLS BLD-ACNC: 0.07 IU/ML
M TB TUBERC IFN-G BLD QL: NEGATIVE
M TB TUBERC IGNF/MITOGEN IGNF CONTROL: >10 IU/ML
PIGEON SERUM AB QL ID: NEGATIVE
QUANTIFERON NIL VALUE: 0.03 IU/ML
S RECTIVIRGULA AB SER QL ID: NEGATIVE
SERVICE CMNT-IMP: NORMAL
T VULGARIS AB SER QL ID: NEGATIVE

## 2018-08-15 NOTE — DISCHARGE INSTRUCTIONS
Learning About Chronic Bronchitis  What is chronic bronchitis? Chronic bronchitis is long-term swelling and the buildup of mucus in the airways of your lungs. The airways (bronchial tubes) get inflamed and make a lot of mucus. This can narrow or block the airways, making it hard for you to breathe. It is a form of COPD (chronic obstructive pulmonary disease). Chronic bronchitis is usually caused by smoking. But chemical fumes, dust, or air pollution also can cause it over time. What can you expect when you have chronic bronchitis? Chronic bronchitis gets worse over time. You cannot undo the damage to your lungs. Over time, you may find that:  · You get short of breath even when you do simple things like get dressed or fix a meal.  · It is hard to eat or exercise. · You lose weight and feel weaker. Over many years, the swelling and mucus from chronic bronchitis make it more likely that you will get lung infections. But there are things you can do to prevent more damage and feel better. What are the symptoms? The main symptoms of chronic bronchitis are:  · A cough that will not go away. · Mucus that comes up when you cough. · Shortness of breath that gets worse when you exercise. At times, your symptoms may suddenly flare up and get much worse. This is a called an exacerbation (say \"egg-ZAMARIXA-[id number]-BAY-barbara\"). When this happens, your usual symptoms quickly get worse and stay bad. This can be dangerous. You may have to go to the hospital.  How can you keep chronic bronchitis from getting worse? Don't smoke. That is the best way to keep chronic bronchitis from getting worse. If you already smoke, it is never too late to stop. If you need help quitting, talk to your doctor about stop-smoking programs and medicines. These can increase your chances of quitting for good. You can do other things to keep chronic bronchitis from getting worse:  · Avoid bad air.  Air pollution, chemical fumes, and dust also can make chronic bronchitis worse. · Get a flu shot every year. A shot may keep the flu from turning into something more serious, like pneumonia. A flu shot also may lower your chances of having a flare-up. · Get a pneumococcal shot. A shot can prevent some of the serious complications of pneumonia. Ask your doctor how often you should get this shot. How is chronic bronchitis treated? Chronic bronchitis is treated with medicines and oxygen. You also can take steps at home to stay healthy and keep your condition from getting worse. Medicines and oxygen therapy  · You may be taking medicines such as:  ¨ Bronchodilators. These help open your airways and make breathing easier. Bronchodilators are either short-acting (work for 6 to 9 hours) or long-acting (work for 24 hours). You inhale most bronchodilators, so they start to act quickly. Always carry your quick-relief inhaler with you in case you need it while you are away from home. ¨ Corticosteroids. These reduce airway inflammation. They come in pill or inhaled form. You must take these medicines every day for them to work well. ¨ Antibiotics. These medicines are used when you have a bacterial lung infection. · Take your medicines exactly as prescribed. Call your doctor if you think you are having a problem with your medicine. · Oxygen therapy boosts the amount of oxygen in your blood and helps you breathe easier. Use the flow rate your doctor has recommended, and do not change it without talking to your doctor first.  Other care at home  · If your doctor recommends it, get more exercise. Walking is a good choice. Bit by bit, increase the amount you walk every day. Try for at least 30 minutes on most days of the week. · Learn breathing methods-such as breathing through pursed lips-to help you become less short of breath. · If your doctor has not set you up with a pulmonary rehabilitation program, talk to him or her about whether rehab is right for you.  Rehab includes exercise programs, education about your disease and how to manage it, help with diet and other changes, and emotional support. · Eat regular, healthy meals. Use bronchodilators about 1 hour before you eat to make it easier to eat. Eat several small meals instead of three large ones. Drink beverages at the end of the meal. Avoid foods that are hard to chew. Follow-up care is a key part of your treatment and safety. Be sure to make and go to all appointments, and call your doctor if you are having problems. It's also a good idea to know your test results and keep a list of the medicines you take. Where can you learn more? Go to http://[employer].info/. Enter U273 in the search box to learn more about \"Learning About Chronic Bronchitis. \"  Current as of: December 6, 2017  Content Version: 11.7  © 4194-7590 [employer]. Care instructions adapted under license by [employer] (which disclaims liability or warranty for this information). If you have questions about a medical condition or this instruction, always ask your healthcare professional. [address] any warranty or liability for your use of this information. COPD and Asthma: Care Instructions  Your Care Instructions    Some people who have chronic obstructive pulmonary disease (COPD) also have asthma. Both of these problems can damage your lungs. This makes it very important to control them. Asthma causes the airways that lead to the lungs to swell and become narrow. This makes it hard to breathe. You may wheeze or cough. If you have a bad attack, you may need emergency care. There are two parts to treating asthma. · Controlling asthma over the long term. · Treating attacks when they occur. You and your doctor can make an asthma treatment plan that will help. This plan tells you the medicines you take every day to reduce the swelling in your airways and prevent attacks.  It also tells you what to do if you have an asthma attack. Follow-up care is a key part of your treatment and safety. Be sure to make and go to all appointments, and call your doctor if you are having problems. It's also a good idea to know your test results and keep a list of the medicines you take. How can you care for yourself at home? To control asthma over the long term  Medicines  Controller medicines reduce swelling in your lungs. They also prevent asthma attacks. Take your controller medicine exactly as prescribed. Talk to your doctor if you have any problems with your medicine. · Inhaled corticosteroid is a common and effective controller medicine. Using it the right way can prevent or reduce most side effects. · Take your controller medicine every day, not just when you have symptoms. This helps prevent problems before they occur. · Always bring your asthma medicine with you when you travel. · Your doctor may prescribe long-acting medicine that combines a corticosteroid with a beta2-agonist. Follow your doctor's instructions exactly about how to take a long-acting medicine. Examples include:  ¨ Fluticasone and salmeterol (Advair). ¨ Budesonide and formoterol (Symbicort). · Do not depend on your controller medicines to stop an asthma attack that has already started. They do not work fast enough to help. · Your doctor may also prescribe anticholinergic inhalers. These include ipratropium (Atrovent) and tiotropium (Spiriva). Education  · Learn what sets off an asthma attack. Avoid these triggers when you can. Common triggers include smoke, air pollution, pollen, animal dander, colds, stress, and cold air. · Do not smoke. Smoking can make COPD and asthma worse. If you need help quitting, talk to your doctor about stop-smoking programs and medicines. These can increase your chances of quitting for good. · Check yourself for symptoms to know which step to follow in your action plan.  Watch for things like being short of breath, having chest tightness, coughing, and wheezing. Also notice if symptoms wake you up at night or if you get tired quickly when you exercise. · You may want to learn how to use a peak flow meter. This measures how open your airways are. It may help you know when you will have an asthma attack. To treat attacks when they occur  Use your asthma action plan when you have an attack. Your quick-relief medicine, such as albuterol, will stop an asthma attack. It relaxes the muscles that get tight around the airways. · Take your quick-relief medicine exactly as prescribed. Talk with your doctor if you have any problems with your medicine. · Keep this medicine with you at all times. · You may need to use this medicine before you exercise. If your doctor prescribed corticosteroid pills to use during an attack, take them as directed. They may take hours to work, but they may shorten the attack and help you breathe better. When should you call for help? Call 911 anytime you think you may need emergency care. For example, call if:    · You have severe trouble breathing.    Call your doctor now or seek immediate medical care if:    · You have new or worse shortness of breath.     · You are coughing more deeply or more often, especially if you notice more mucus or a change in the color of your mucus.     · You cough up blood.     · You have new or increased swelling in your legs or belly.     · You have a fever.     · You have used your quick-relief medicine but you are still short of breath.    Watch closely for changes in your health, and be sure to contact your doctor if you have any problems. Where can you learn more? Go to http://lena-may.info/. Enter A350 in the search box to learn more about \"COPD and Asthma: Care Instructions. \"  Current as of: December 6, 2017  Content Version: 11.7  © 1902-0643 [employer].  Care instructions adapted under license by [employer] (which disclaims liability or warranty for this information). If you have questions about a medical condition or this instruction, always ask your healthcare professional. [address] any warranty or liability for your use of this information.

## 2018-08-15 NOTE — ED NOTES
Montse Frausto is a 74 y.o. male that was discharged in good condition. The patients diagnosis, condition and treatment were explained to  patient and aftercare instructions were given. The patient verbalized understanding. Patient armband removed and shredded.

## 2018-08-15 NOTE — ED PROVIDER NOTES
EMERGENCY DEPARTMENT HISTORY AND PHYSICAL EXAM    1:01 PM      Date: 8/15/2018  Patient Name: Barney Samson    History of Presenting Illness     Chief Complaint   Patient presents with    Cough    Fever         History Provided By: Patient    Chief Complaint: Cough   Duration: 6 Days  Timing:  Constant  Quality: Productive   Severity: Moderate  Modifying Factors: nothing makes the Sx better or worse   Associated Symptoms: fever, SOB, chills, dizziness       Additional History (Context): Barney Samson is a 74 y.o. male with PMHx of CAD, palpitations, PVC's, COPD, HTN, and pulmonary fibrosis who presents with a constant productive moderate cough that started x 6 days ago and remains persistent. Nothing makes the Sx better or worse. Associated Sx include fever, SOB, chills, and dizziness. Pt saw his PCP \"this morning\" for similar Sx and was instructed to come to the ED to r/o pneumonia. Also reports having low oxygen saturation. Pt has had x 3 episodes of pneumonia within the last x 7 months. Denies any tobacco use. Denies any further complaints or symptoms at the moment. PCP: Pedro Pringle NP    Current Outpatient Prescriptions   Medication Sig Dispense Refill    ipratropium (ATROVENT) 0.03 % nasal spray 2 Sprays every twelve (12) hours.  predniSONE (DELTASONE) 20 mg tablet Take 20 mg by mouth daily.  montelukast (SINGULAIR) 10 mg tablet Take 10 mg by mouth daily.  albuterol (PROVENTIL HFA) 90 mcg/actuation inhaler Take 2 Puffs by inhalation every four (4) hours as needed for Wheezing. 1 Inhaler 0    albuterol (PROVENTIL HFA, VENTOLIN HFA, PROAIR HFA) 90 mcg/actuation inhaler Take 2 Puffs by inhalation every four (4) hours as needed for Wheezing or Shortness of Breath. Ventolin HFA 1 Inhaler 0    metoprolol tartrate (LOPRESSOR) 50 mg tablet Take 1 Tab by mouth two (2) times a day. 60 Tab 6    aspirin delayed-release 81 mg tablet Take  by mouth daily.       benzonatate (TESSALON) 200 mg capsule Take 200 mg by mouth three (3) times daily as needed for Cough.  BREO ELLIPTA 200-25 mcg/dose inhaler Take 1 Puff by inhalation daily.  apixaban (ELIQUIS) 5 mg tablet Take 1 Tab by mouth two (2) times a day. 60 Tab 6    levothyroxine (SYNTHROID) 50 mcg tablet Take 75 mcg by mouth daily.  LORazepam (ATIVAN) 1 mg tablet Take  by mouth every six (6) hours as needed.  simvastatin (ZOCOR) 80 mg tablet Take 80 mg by mouth nightly. Indications: Patient is taking half a tab      nitroglycerin (NITROSTAT) 0.4 mg SL tablet 1 Tab by SubLINGual route every five (5) minutes as needed for Chest Pain. 25 Tab 3    omeprazole (PRILOSEC OTC) 20 mg tablet Take 20 mg by mouth as needed.  cetirizine (ZYRTEC) 10 mg tablet Take  by mouth as needed.  zolpidem (AMBIEN) 10 mg tablet Take 12.5 mg by mouth nightly.  coenzyme q10 (CO Q-10) 10 mg Cap Take 100 mg by mouth daily.  azithromycin (ZITHROMAX) 250 mg tablet Take 250 mg by mouth daily.  cefUROXime (CEFTIN) 500 mg tablet Take  by mouth two (2) times a day. Past History     Past Medical History:  Past Medical History:   Diagnosis Date    Arrhythmia     Bronchitis 6/2013        CABG x 4 2001    CAD (coronary artery disease)     Cardiac cath 12/06/2012    RCA prev stent patent. LM severe but patent (supplies pLAD & CX). dLAD 60-85% (2.25 x 30-mm Resolute stent, resid 0%). SVG to % (known). SVG to dLAD 100% (known). Seq SVG to pLAD & RI patent. LVEDP 18.  EF 60%. Poss mild mid anterior hypk.  Cardiac nuclear imaging test, low risk 01/16/2017    Low risk. Sm basal inferior fixed defect, likely artifact. No ischemia. No WMA. EF 65%. Neg EKG on pharm stress test.  Unchanged from study of 1/28/15.     Chest pain     atypical    Chronic lung disease     Chronic obstructive pulmonary disease (HCC)     Coronary artery disease     Status post CABG x 4, with stenting of the native RCA in Dec 2001/ EF 60%    HAYWARD (dyspnea on exertion)     Elevated liver enzymes     mild    Hypercholesterolemia     Hypertension     Palpitations     Pulmonary fibrosis (HCC)     PVC's (premature ventricular contractions)     Unspecified sleep apnea     wife stated pt's doctor aware  is unable to jazmin use of cpap    Vertigo     mild       Past Surgical History:  Past Surgical History:   Procedure Laterality Date    HAND/FINGER SURGERY UNLISTED  11-16-11    right    HX ANGIOPLASTY  12/2001, 2012    with stenting of native RCA    HX CHOLECYSTECTOMY  2010    HX CORONARY ARTERY BYPASS GRAFT  6/1996    x 4; Single SVG to distal LAD; sequential SVG to proximal LAD & intermediate marginal branch; single SVG to distal RCA    HX HEENT      CATARACT    HX HERNIA REPAIR  2007       Family History:  Family History   Problem Relation Age of Onset    Hypertension Brother     Arthritis-osteo Brother     Cancer Mother      bone and breast cancer       Social History:  Social History   Substance Use Topics    Smoking status: Never Smoker    Smokeless tobacco: Never Used    Alcohol use 8.4 oz/week     14 Shots of liquor per week       Allergies: Allergies   Allergen Reactions    Levofloxacin Swelling    Doxycycline Rash    Lipitor [Atorvastatin] Other (comments)     Joint pain         Review of Systems       Review of Systems   Constitutional: Positive for chills and fever. Eyes: Negative. Respiratory: Positive for cough and shortness of breath. Cardiovascular: Negative. Gastrointestinal: Negative. Endocrine: Negative. Genitourinary: Negative. Musculoskeletal: Negative. Skin: Negative. Allergic/Immunologic: Negative. Neurological: Positive for dizziness. Hematological: Negative. Psychiatric/Behavioral: Negative. All other systems reviewed and are negative.         Physical Exam     Visit Vitals    /57    Pulse (!) 121    Temp 98.3 °F (36.8 °C)    Resp 20    Ht 5' 6\" (1.676 m)    Wt 68 kg (150 lb)    SpO2 93%    BMI 24.21 kg/m2         Physical Exam   Constitutional: He is oriented to person, place, and time. He appears well-developed and well-nourished. No distress. HENT:   Head: Normocephalic. Mouth/Throat: Oropharynx is clear and moist.   Eyes: Conjunctivae and EOM are normal. Pupils are equal, round, and reactive to light. Neck: Normal range of motion. Neck supple. Cardiovascular: Normal rate, regular rhythm, normal heart sounds and intact distal pulses. No murmur heard. Pulmonary/Chest: Effort normal. No respiratory distress. He has no rales. He exhibits no tenderness. Positive for diffuse rhonchi and faint wheezes. Abdominal: Soft. Bowel sounds are normal. He exhibits no distension. There is no tenderness. There is no rebound. Musculoskeletal: Normal range of motion. He exhibits no edema or tenderness. Neurological: He is alert and oriented to person, place, and time. No cranial nerve deficit. He exhibits normal muscle tone. Coordination normal.   Skin: Skin is warm and dry. No rash noted. Psychiatric: He has a normal mood and affect. His behavior is normal. Judgment and thought content normal.   Nursing note and vitals reviewed.         Diagnostic Study Results     Labs -  Recent Results (from the past 12 hour(s))   EKG, 12 LEAD, INITIAL    Collection Time: 08/15/18 12:20 PM   Result Value Ref Range    Ventricular Rate 100 BPM    Atrial Rate 100 BPM    P-R Interval 160 ms    QRS Duration 114 ms    Q-T Interval 344 ms    QTC Calculation (Bezet) 443 ms    Calculated P Axis 29 degrees    Calculated R Axis -26 degrees    Calculated T Axis 47 degrees    Diagnosis       Normal sinus rhythm  Possible Left atrial enlargement  Incomplete right bundle branch block  Left ventricular hypertrophy with repolarization abnormality  Abnormal ECG  When compared with ECG of 28-FEB-2018 22:56,  premature ventricular complexes are no longer present  Nonspecific T wave abnormality has replaced inverted T waves in Inferior   leads     CBC WITH AUTOMATED DIFF    Collection Time: 08/15/18  1:35 PM   Result Value Ref Range    WBC 11.1 4.6 - 13.2 K/uL    RBC 4.21 (L) 4.70 - 5.50 M/uL    HGB 13.9 13.0 - 16.0 g/dL    HCT 40.7 36.0 - 48.0 %    MCV 96.7 74.0 - 97.0 FL    MCH 33.0 24.0 - 34.0 PG    MCHC 34.2 31.0 - 37.0 g/dL    RDW 13.9 11.6 - 14.5 %    PLATELET 720 971 - 384 K/uL    MPV 9.2 9.2 - 11.8 FL    NEUTROPHILS 87 (H) 40 - 73 %    LYMPHOCYTES 7 (L) 21 - 52 %    MONOCYTES 6 3 - 10 %    EOSINOPHILS 0 0 - 5 %    BASOPHILS 0 0 - 2 %    ABS. NEUTROPHILS 9.6 (H) 1.8 - 8.0 K/UL    ABS. LYMPHOCYTES 0.8 (L) 0.9 - 3.6 K/UL    ABS. MONOCYTES 0.6 0.05 - 1.2 K/UL    ABS. EOSINOPHILS 0.0 0.0 - 0.4 K/UL    ABS. BASOPHILS 0.0 0.0 - 0.1 K/UL    DF AUTOMATED     METABOLIC PANEL, BASIC    Collection Time: 08/15/18  1:35 PM   Result Value Ref Range    Sodium 135 (L) 136 - 145 mmol/L    Potassium 4.3 3.5 - 5.5 mmol/L    Chloride 99 (L) 100 - 108 mmol/L    CO2 25 21 - 32 mmol/L    Anion gap 11 3.0 - 18 mmol/L    Glucose 127 (H) 74 - 99 mg/dL    BUN 22 (H) 7.0 - 18 MG/DL    Creatinine 1.36 (H) 0.6 - 1.3 MG/DL    BUN/Creatinine ratio 16 12 - 20      GFR est AA >60 >60 ml/min/1.73m2    GFR est non-AA 51 (L) >60 ml/min/1.73m2    Calcium 8.6 8.5 - 10.1 MG/DL   POC LACTIC ACID    Collection Time: 08/15/18  1:43 PM   Result Value Ref Range    Lactic Acid (POC) 1.5 0.4 - 2.0 mmol/L   POC G3    Collection Time: 08/15/18  1:52 PM   Result Value Ref Range    Device: ROOM AIR      pH (POC) 7.492 (H) 7.35 - 7.45      pCO2 (POC) 29.7 (L) 35.0 - 45.0 MMHG    pO2 (POC) 69 (L) 80 - 100 MMHG    HCO3 (POC) 22.8 22 - 26 MMOL/L    sO2 (POC) 95 92 - 97 %    Base excess (POC) 0 mmol/L    Allens test (POC) N/A      Total resp.  rate 22      Site RIGHT BRACHIAL      Specimen type (POC) ARTERIAL      Performed by Luciano Cardozo (Angel)    POC G3    Collection Time: 08/15/18  4:01 PM   Result Value Ref Range    Device: ROOM AIR pH (POC) 7.428 7.35 - 7.45      pCO2 (POC) 29.1 (L) 35.0 - 45.0 MMHG    pO2 (POC) 68 (L) 80 - 100 MMHG    HCO3 (POC) 19.3 (L) 22 - 26 MMOL/L    sO2 (POC) 94 92 - 97 %    Base deficit (POC) 4 mmol/L    Allens test (POC) N/A      Total resp. rate 20      Site LEFT BRACHIAL      Specimen type (POC) ARTERIAL      Performed by Luciano Ruffin (Angel)        Radiologic Studies -   XR CHEST PORT   Final Result      IMPRESSION:  Hypoinflation without acute findings.        Medical Decision Making   I am the first provider for this patient. I reviewed the vital signs, available nursing notes, past medical history, past surgical history, family history and social history. Vital Signs-Reviewed the patient's vital signs. Pulse Oximetry Analysis -  96 % on RA, normal     EKG: Interpreted by the EP. Time Interpreted: 12:20 PM   Rate: 100 bpm    Rhythm: NSR    Interpretation: No STEMI     Records Reviewed: Nursing Notes (Time of Review: 1:01 PM)    ED Course: Progress Notes, Reevaluation, and Consults:    Provider Notes (Medical Decision Making): Dif Dx: asthmatic bronchitis, bronchitis, pneumonia, COPD, hypoxia and etc.    Diagnosis     Clinical Impression:   1. Mild intermittent asthmatic bronchitis with acute exacerbation    2. Simple chronic bronchitis (HCC)        Disposition: discharged. 4:43 PM  I informed the pt that he needed further workup for adequate evaluation for his cough, SOB, hypoxia  and that by refusing the above, he is at risk for COPD exacerbation, MI, death, sepsis. He is awake, alert, and he understands his condition and the risks involved in leaving. The patient has received Albuterol treatment and steroids and it has been 1 hour since last receiving this medication. He is clinically aware of his surroundings and able to ask appropriate questions, the patients and his wife with ER nurse present confirms he is not clinically intoxicated and able to make medical decisions.  He verbalized knowing the risks and understood it was recommended that he stay and could also return at any time. The patient's wife was present for the discussion and also verbalized that they understood both diagnosis, risks and could return at any time. They were both provided with warnings regarding worsening of his condition and were provided instructions to follow up with Loren Wasserman NP tomorrow or return to the Emergency Room as soon as possible. This discussion was witnessed by ER nurse. Follow-up Information     Follow up With Details Comments [address] [place] [address] LUZ Iverson Schedule an appointment as soon as possible for a visit For [address] [place] [address] EMERGENCY DEPT Go to As needed, If symptoms worsen [address] [id number] [phone]           Patient's Medications   Start Taking    ALBUTEROL (PROVENTIL HFA) 90 MCG/ACTUATION INHALER    Take 2 Puffs by inhalation every four (4) hours as needed for Wheezing. Continue Taking    APIXABAN (ELIQUIS) 5 MG TABLET    Take 1 Tab by mouth two (2) times a day. ASPIRIN DELAYED-RELEASE 81 MG TABLET    Take  by mouth daily. AZITHROMYCIN (ZITHROMAX) 250 MG TABLET    Take 250 mg by mouth daily. BENZONATATE (TESSALON) 200 MG CAPSULE    Take 200 mg by mouth three (3) times daily as needed for Cough. BREO ELLIPTA 200-25 MCG/DOSE INHALER    Take 1 Puff by inhalation daily. CEFUROXIME (CEFTIN) 500 MG TABLET    Take  by mouth two (2) times a day. CETIRIZINE (ZYRTEC) 10 MG TABLET    Take  by mouth as needed. COENZYME Q10 (CO Q-10) 10 MG CAP    Take 100 mg by mouth daily. IPRATROPIUM (ATROVENT) 0.03 % NASAL SPRAY    2 Sprays every twelve (12) hours. LEVOTHYROXINE (SYNTHROID) 50 MCG TABLET    Take 75 mcg by mouth daily. LORAZEPAM (ATIVAN) 1 MG TABLET    Take  by mouth every six (6) hours as needed.     METOPROLOL TARTRATE (LOPRESSOR) 50 MG TABLET    Take 1 Tab by mouth two (2) times a day. MONTELUKAST (SINGULAIR) 10 MG TABLET    Take 10 mg by mouth daily. NITROGLYCERIN (NITROSTAT) 0.4 MG SL TABLET    1 Tab by SubLINGual route every five (5) minutes as needed for Chest Pain. OMEPRAZOLE (PRILOSEC OTC) 20 MG TABLET    Take 20 mg by mouth as needed. PREDNISONE (DELTASONE) 20 MG TABLET    Take 20 mg by mouth daily. SIMVASTATIN (ZOCOR) 80 MG TABLET    Take 80 mg by mouth nightly. Indications: Patient is taking half a tab    ZOLPIDEM (AMBIEN) 10 MG TABLET    Take 12.5 mg by mouth nightly. These Medications have changed    Modified Medication Previous Medication    ALBUTEROL (PROVENTIL HFA, VENTOLIN HFA, PROAIR HFA) 90 MCG/ACTUATION INHALER albuterol (PROVENTIL HFA, VENTOLIN HFA, PROAIR HFA) 90 mcg/actuation inhaler       Take 2 Puffs by inhalation every four (4) hours as needed for Wheezing or Shortness of Breath. Ventolin HFA    Take 2 Puffs by inhalation every four (4) hours as needed for Wheezing or Shortness of Breath. Ventolin HFA   Stop Taking    No medications on file     _______________________________    Attestations:  Serena Tong (Aj) acting as a scribe for and in the presence of Fransico Ochoa MD      August 15, 2018 at 1:01 PM       Provider Attestation:      I personally performed the services described in the documentation, reviewed the documentation, as recorded by the scribe in my presence, and it accurately and completely records my words and actions.  August 15, 2018 at 1:01 PM - Fransico Ochoa MD    _______________________________

## 2018-08-15 NOTE — ED TRIAGE NOTES
Pt was sent here from drs office. Pt reports cough since Thurs with chills and dizziness. Pt was found to have temp of 103 at drs office and low oxygen sats of 88% RA.

## 2018-08-27 NOTE — MR AVS SNAPSHOT
[address] [place] [id number] [phone] Patient: Shin Taylor MRN: [id number] SDN:[id number] Visit Information Date & Time Provider Department Dept. Phone Encounter #  
 8/27/2018  1:00 PM Keisha Sy MD [facility] [address] [id number] Upcoming Health Maintenance Date Due ZOSTER VACCINE AGE 60> 11/26/2002 GLAUCOMA SCREENING Q2Y 1/26/2008 Pneumococcal 65+ Low/Medium Risk (1 of 2 - PCV13) 1/26/2008 Influenza Age 5 to Adult 8/1/2018 DTaP/Tdap/Td series (2 - Td) 10/6/2027 Allergies as of 8/27/2018  Review Complete On: 8/27/2018 By: Keisha Sy MD  
  
 Severity Noted Reaction Type Reactions Levofloxacin Medium 12/26/2017   Side Effect Swelling Doxycycline  08/15/2018    Rash Lipitor [Atorvastatin]  03/06/2012    Other (comments) Joint pain Current Immunizations  Never Reviewed Name Date Tdap 10/6/2017  3:17 PM  
  
 Not reviewed this visit You Were Diagnosed With   
  
 Codes Comments Paroxysmal atrial fibrillation (HCC)    -  Primary ICD-10-CM: I48.0 ICD-9-CM: 427.31 Pulmonary fibrosis ([facility] [address]     ICD-10-CM: J84.10 ICD-9-CM: 031 Obstructive sleep apnea syndrome     ICD-10-CM: G47.33 
ICD-9-CM: 327.23   
 HAYWARD (dyspnea on exertion)     ICD-10-CM: R06.09 
ICD-9-CM: 786.09 Diastolic dysfunction, left ventricle     ICD-10-CM: I51.9 ICD-9-CM: 429.9 Atherosclerosis of native coronary artery of native heart without angina pectoris     ICD-10-CM: I25.10 ICD-9-CM: 414.01 Vitals BP Pulse Height(growth percentile) Weight(growth percentile) SpO2 BMI  
 128/72 86 5' 6\" (1.676 m) 151 lb (68.5 kg) 93% 24.37 kg/m2 Smoking Status Never Smoker Vitals History BMI and BSA Data Body Mass Index Body Surface Area  
 24.37 kg/m 2 1.79 m 2 Preferred Pharmacy Pharmacy Name Phone [facility] [phone] [facility] [phone] Your Updated Medication List  
  
   
This list is accurate as of 8/27/18  1:46 PM.  Always use your most recent med list.  
  
  
  
  
 albuterol 90 mcg/actuation inhaler Commonly known as:  PROVENTIL HFA, VENTOLIN HFA, PROAIR HFA Take 2 Puffs by inhalation every four (4) hours as needed for Wheezing or Shortness of Breath. Ventolin HFA AMBIEN 10 mg tablet Generic drug:  zolpidem Take 12.5 mg by mouth nightly. apixaban 5 mg tablet Commonly known as:  Nick Gault Take 1 Tab by mouth two (2) times a day. aspirin delayed-release 81 mg tablet Take  by mouth daily. benzonatate 200 mg capsule Commonly known as:  TESSALON Take 200 mg by mouth three (3) times daily as needed for Cough. BREO ELLIPTA 200-25 mcg/dose inhaler Generic drug:  fluticasone-vilanterol Take 1 Puff by inhalation daily. cefUROXime 500 mg tablet Commonly known as:  CEFTIN Take  by mouth two (2) times a day. CO Q-10 10 mg Cap Generic drug:  coenzyme q10 Take 100 mg by mouth daily. ipratropium 0.03 % nasal spray Commonly known as:  ATROVENT  
2 Sprays every twelve (12) hours. levothyroxine 50 mcg tablet Commonly known as:  SYNTHROID Take 75 mcg by mouth daily. LORazepam 1 mg tablet Commonly known as:  ATIVAN Take  by mouth every six (6) hours as needed. metoprolol tartrate 50 mg tablet Commonly known as:  LOPRESSOR Take 1 Tab by mouth two (2) times a day. nitroglycerin 0.4 mg SL tablet Commonly known as:  NITROSTAT  
1 Tab by SubLINGual route every five (5) minutes as needed for Chest Pain. predniSONE 20 mg tablet Commonly known as:  Niko Gun Take 20 mg by mouth daily. PriLOSEC OTC 20 mg tablet Generic drug:  omeprazole Take 20 mg by mouth as needed. SINGULAIR 10 mg tablet Generic drug:  montelukast  
 Take 10 mg by mouth daily. ZITHROMAX 250 mg tablet Generic drug:  azithromycin Take 250 mg by mouth daily. ZOCOR 80 mg tablet Generic drug:  simvastatin Take 80 mg by mouth nightly. Indications: Patient is taking half a tab ZyrTEC 10 mg tablet Generic drug:  cetirizine Take  by mouth as needed. We Performed the Following AMB POC EKG ROUTINE W/ 12 LEADS, INTER & REP [80760 CPT(R)] Introducing [facility]! Juliette Zeng introduces [employer] patient portal. Now you can access parts of your medical record, email your doctor's office, and request medication refills online. 1. In your internet browser, go to [email or web address] 2. Click on the First Time User? Click Here link in the Sign In box. You will see the New Member Sign Up page. 3. Enter your [employer] Access Code exactly as it appears below. You will not need to use this code after youve completed the sign-up process. If you do not sign up before the expiration date, you must request a new code. · [employer] Access Code: [email or web address] Expires: 10/28/2018  2:42 PM 
 
4. Enter the last four digits of your Social Security Number (xxxx) and Date of Birth (mm/dd/yyyy) as indicated and click Submit. You will be taken to the next sign-up page. 5. Create a [employer] ID. This will be your [employer] login ID and cannot be changed, so think of one that is secure and easy to remember. 6. Create a [employer] password. You can change your password at any time. 7. Enter your Password Reset Question and Answer. This can be used at a later time if you forget your password. 8. Enter your e-mail address. You will receive e-mail notification when new information is available in [address] 9. Click Sign Up. You can now view and download portions of your medical record. 10. Click the Download Summary menu link to download a portable copy of your medical information. If you have questions, please visit the Frequently Asked Questions section of the [employer] website. Remember, MySiteApp is NOT to be used for urgent needs. For medical emergencies, dial 911. Now available from your iPhone and Android! Please provide this summary of care documentation to your next provider. Your primary care clinician is listed as [facility]. If you have any questions after today's visit, please call [phone].

## 2018-08-27 NOTE — PROGRESS NOTES
1. Have you been to the ER, urgent care clinic since your last visit? Hospitalized since your last visit? No    2. Have you seen or consulted any other health care providers outside of the [address] [place] [address] since your last visit? Include any pap smears or colon screening.  No

## 2018-08-27 NOTE — PROGRESS NOTES
HISTORY OF PRESENT ILLNESS  Ethel Giraldo is a 74 y.o. male. HPI  He is complaining of worsening shortness of breath. He has had no active wheezing or coughing. He was apparently evaluated by a pulmonologist for pulmonary fibrosis and was treated with a steroid. He felt quite well for a while ever since he was started on a steroid. He was told by his pulmonologist that his pulmonary fibrosis was responding to steroids. Overall he felt better and also his erectyle dysfunction improved as well. Unfortunately, it was too good to be true. He took a turn for the worse and ended up in the ER with more shortness of breath. In the ER, chest x-ray demonstrated no acute pathology, however, the CT scan demonstrated worsening pulmonary fibrosis with significant interval increase in diffuse interstitial lung disease including mosaic groundglass opacities and interstitial septal thickening now extending significantly into the apical regions of both lungs. He since has been treated with antibiotics by his pulmonologist and steroid has been continued. He does have poor oxygen concentration currently down to 93 on room air at rest and he has been waiting for home oxygen to be delivered. He denies chest pain, orthopnea, PND. He denies palpitation, dizziness or syncope. He denies any symptoms of TIA or amaurosis fugax. He had repeat echocardiogram on 12/22/17 which demonstrated lower limit of normal LV function with EF in the 50-55% range and evidence grade 1 diastolic dysfunction parameters. The right ventricle was mildly dilated and the systolic function was mildly reduced. The left atrial dimension was normal with a volume index of 25 mL/m2 and the right atrial dimension was at the upper limit of normal. There was doppler evidence of moderate regurgitation of the aortic valve but no aortic stenosis. There is no mitral valvular pathology. He had a Holter monitor on 12/12/17 which demonstrated sinus rhythm.  There are frequent PVCs and 9 short runs of nonsustained SVT, the longest 4-5 beats.       He has a history of CABG X4 in the latter part of June 1996, which consisted of:    1. Single SVG to the distal LAD. 2. Sequential SVG to the proximal LAD and intermediate marginal branch. 3. Single SVG to the distal RCA. Because of an abnormal thallium myocardial scanning, he underwent a cardiac catheterization on December 4, 2001, which demonstrated total occlusion of the vein graft to the distal LAD but patent sequential SVG to the proximal LAD and intermediate marginal branch. The SVG to the distal RCA was totally occluded; however, the RCA was still open with a 90% stenosis. It was successfully angioplastied and stented. His LV function was mildly diminished with an EF in the range of 50% or less.    Because of the palpitations and skipping, he underwent a 24-hour Holter monitor on February 18, 2004, which demonstrated some frequent atrial ectopies and intermittent short runs of atrial tachycardia and fibrillation and rare PVCs. He was advised to take a beta blocker, but he has been very reluctant to take a beta blocker because of fear of sexual dysfunction. He has had no sustained tachycardia thus far. Because of recurrent chest pain, he underwent cardiac catheterization on March 7, 2005, which demonstrated:    1. 60% stenosis of the left main trunk.    2. Total occlusion of the LAD in the proximal segment.    3. Severe, diffuse disease of the circumflex artery, with total occlusion.    4. 80% stenosis of the ostium of the ramus intermedius.    5. Patent dominant right coronary artery, with 10% stenosis.    6. Patent SVG to the proximal LAD, but total occlusion of the vein graft to the distal LAD.    7. Total occlusion of the vein graft to the distal RCA.    8. Normal left ventricular systolic function, with EF in the 60% range.    It was felt that he could be best treated medically.  He underwent stress nuclear cardiac imaging on May 7, 2007, at which time he was able to exercise 9-minutes, with no chest pain or EKG changes. His nuclear imaging demonstrated mild scarring and ischemia in the inferior wall. The ejection fraction was estimated in the 72% range. He had repeat stress nuclear cardiac imaging on August 20, 2009 at which time he was able to exercise 9 minutes and 35 seconds with no chest pain or EKG changes. Perfusion imaging was essentially unchanged in comparison to the one from May 7, 2007.    He had repeat stress test as a Lexiscan Cardiolite myocardial perfusion imaging on 8/2/11 which was essentially unchanged and negative.    His follow up stress nuclear cardiac imaging on 7/3/2012 demonstrated normal perfusion with no evidence of scaring or ischemia. The ejection fraction was estimated in the 70% range.    Because of the continued dyspnea on exertion, he had repeat cardiac catheterization on 12/6/12 to rule out any atypical presentation of angina. His coronary angiogram demonstrated new 60-85% diffuse narrowing in the native LAD at the insertion of the vein graft to the proximal LAD. The remainder of the coronary anatomy was unchanged and there was no evidence of restenosis of the stented right coronary artery. LVEDP was up to 18 mmHg. The left ventricular ejection fraction was in the 60% range.    The native LAD was subsequently stented with the 2.25 x 30 mm Resolute stent successfully.    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 His CT scan of the chest demonstrated evidence of COPD with emphysema in the upper lobes and mosaic attenuation which is widespread and likely secondary to small airway disease. There was also some bronchial thickening and mild cylindrical bronchiectasis. He was subsequently referred to a pulmonologist who advised him that he has a chronic bronchitis.  He was started on inhalers with very little help.    He underwent stress nuclear cardiac imaging for follow up on 1/28/15, which demonstrated normal perfusion with no evidence of scarring or ischemia. The ejection fraction was in the 70% range.   He underwent followup stress nuclear cardiac imaging on 01/16/2017, which demonstrated a small area of fixed perfusion defect in the basal inferior wall most likely related to diaphragmatic attenuation with no other perfusion defect.  Ejection fraction was in the range of 65%. He was seen on 12/5/17 at which time he was found to have new onset atrial fibrillation and evidence of heart failure with elevated proBNP level. He was treated with Lasix and has been treated with rate controlling medication and anticoagulation with Eliquis. Review of Systems   Constitutional: Negative for malaise/fatigue and weight loss. HENT: Negative for hearing loss. Eyes: Negative for blurred vision and double vision. Respiratory: Positive for shortness of breath. Cardiovascular: Positive for palpitations and leg swelling. Negative for chest pain, orthopnea, claudication and PND. Gastrointestinal: Negative for blood in stool, heartburn and melena. Genitourinary: Positive for frequency. Negative for dysuria, hematuria and urgency. Musculoskeletal: Negative for back pain and joint pain. Skin: Negative for itching and rash. Neurological: Negative for dizziness, loss of consciousness and weakness. Psychiatric/Behavioral: Negative for depression and memory loss. Physical Exam   Constitutional: He is oriented to person, place, and time. He appears well-developed and well-nourished. HENT:   Head: Normocephalic and atraumatic. Eyes: Conjunctivae are normal. Pupils are equal, round, and reactive to light. Neck: Normal range of motion. Neck supple. No JVD present. Cardiovascular: Normal rate, regular rhythm, S1 normal and S2 normal.   Extrasystoles are present. PMI is not displaced.   Exam reveals no gallop and no friction rub. Murmur heard. Pulses:       Carotid pulses are 3+ on the right side, and 3+ on the left side. Pulmonary/Chest: Effort normal. He has no rales. Abdominal: Soft. There is no tenderness. Musculoskeletal: He exhibits no edema. Neurological: He is alert and oriented to person, place, and time. No cranial nerve deficit. Skin: Skin is warm and dry. Psychiatric: He has a normal mood and affect. His behavior is normal.     Visit Vitals    /72    Pulse 86    Ht 5' 6\" (1.676 m)    Wt 68.5 kg (151 lb)    SpO2 93%    BMI 24.37 kg/m2       Past Medical History:   Diagnosis Date    Arrhythmia     Bronchitis 6/2013        CABG x 4 2001    CAD (coronary artery disease)     Cardiac cath 12/06/2012    RCA prev stent patent. LM severe but patent (supplies pLAD & CX). dLAD 60-85% (2.25 x 30-mm Resolute stent, resid 0%). SVG to % (known). SVG to dLAD 100% (known). Seq SVG to pLAD & RI patent. LVEDP 18.  EF 60%. Poss mild mid anterior hypk.  Cardiac nuclear imaging test, low risk 01/16/2017    Low risk. Sm basal inferior fixed defect, likely artifact. No ischemia. No WMA. EF 65%. Neg EKG on pharm stress test.  Unchanged from study of 1/28/15.     Chest pain     atypical    Chronic lung disease     Chronic obstructive pulmonary disease (HCC)     Coronary artery disease     Status post CABG x 4, with stenting of the native RCA in Dec 2001/ EF 60%    HAYWARD (dyspnea on exertion)     Elevated liver enzymes     mild    Hypercholesterolemia     Hypertension     Palpitations     Pulmonary fibrosis ([address]     PVC's (premature ventricular contractions)     Unspecified sleep apnea     wife stated pt's doctor aware  is unable to jazmin use of cpap    Vertigo     mild       Social History     Social History    Marital status:      Spouse name: N/A    Number of children: N/A    Years of education: N/A     Occupational History    Not on file. Social History Main Topics    Smoking status: Never Smoker    Smokeless tobacco: Never Used    Alcohol use 8.4 oz/week     14 Shots of liquor per week    Drug use: No    Sexual activity: Not on file     Other Topics Concern    Not on file     Social History Narrative       Family History   Problem Relation Age of Onset    Hypertension Brother     Arthritis-osteo Brother     Cancer Mother      bone and breast cancer       Past Surgical History:   Procedure Laterality Date    HAND/FINGER SURGERY UNLISTED  11-16-11    right   Ulises Ruder ANGIOPLASTY  12/2001, 2012    with stenting of native RCA    HX CHOLECYSTECTOMY  2010    HX CORONARY ARTERY BYPASS GRAFT  6/1996    x 4; Single SVG to distal LAD; sequential SVG to proximal LAD & intermediate marginal branch; single SVG to distal RCA    HX HEENT      CATARACT    HX HERNIA REPAIR  2007       Current Outpatient Prescriptions   Medication Sig Dispense Refill    azithromycin (ZITHROMAX) 250 mg tablet Take 250 mg by mouth daily.  ipratropium (ATROVENT) 0.03 % nasal spray 2 Sprays every twelve (12) hours.  cefUROXime (CEFTIN) 500 mg tablet Take  by mouth two (2) times a day.  predniSONE (DELTASONE) 20 mg tablet Take 20 mg by mouth daily.  montelukast (SINGULAIR) 10 mg tablet Take 10 mg by mouth daily.  albuterol (PROVENTIL HFA, VENTOLIN HFA, PROAIR HFA) 90 mcg/actuation inhaler Take 2 Puffs by inhalation every four (4) hours as needed for Wheezing or Shortness of Breath. Ventolin HFA 1 Inhaler 0    metoprolol tartrate (LOPRESSOR) 50 mg tablet Take 1 Tab by mouth two (2) times a day. 60 Tab 6    aspirin delayed-release 81 mg tablet Take  by mouth daily.  benzonatate (TESSALON) 200 mg capsule Take 200 mg by mouth three (3) times daily as needed for Cough.  BREO ELLIPTA 200-25 mcg/dose inhaler Take 1 Puff by inhalation daily.  apixaban (ELIQUIS) 5 mg tablet Take 1 Tab by mouth two (2) times a day.  61 Tab 6    levothyroxine (SYNTHROID) 50 mcg tablet Take 75 mcg by mouth daily.  LORazepam (ATIVAN) 1 mg tablet Take  by mouth every six (6) hours as needed.  simvastatin (ZOCOR) 80 mg tablet Take 80 mg by mouth nightly. Indications: Patient is taking half a tab      nitroglycerin (NITROSTAT) 0.4 mg SL tablet 1 Tab by SubLINGual route every five (5) minutes as needed for Chest Pain. 25 Tab 3    omeprazole (PRILOSEC OTC) 20 mg tablet Take 20 mg by mouth as needed.  cetirizine (ZYRTEC) 10 mg tablet Take  by mouth as needed.  zolpidem (AMBIEN) 10 mg tablet Take 12.5 mg by mouth nightly.  coenzyme q10 (CO Q-10) 10 mg Cap Take 100 mg by mouth daily. EKG: unchanged from previous tracings, there are no previous tracings available for comparison, normal sinus rhythm, nonspecific ST and T waves changes, PAC's noted, left axis deviation, incomplete RBBB  . ASSESSMENT and PLAN  Encounter Diagnoses   Name Primary?  CADof native coronary artery of native heart without angina pectoris,status post CABG X4 in June 1996, with stenting of the native RCA in December 2001/ EF 60%; s/p stenting in the LAD 12/6/12 Yes    Paroxysmal atrial fibrillation (HCC)     Pulmonary fibrosis ([address]     Obstructive sleep apnea syndrome     HAYWARD (dyspnea on exertion)     Diastolic dysfunction, left ventricle    Cardiac-wise he appears to be stable with no symptoms of angina or cardiac decompensation. He shows no clinical findings of pulmonary hypertension as of yet. He is largely in sinus rhythm with intermittent bursts of atrial fibrillation. He has pulmonary fibrosis and does not appear to be steroid respondent, although it appeared that he did have some favorable response to steroid at the beginning.  Further management and treatment will be entirely up to his pulmonologist. He was advised that maintaining adequate oxygen concentration is extremely important to prevent pulmonary hypertension and he should be on oxygen as soon as the oxygen is delivered to his house. He seems to have a very good understanding. He understood the process of prolonged hypoxemia and the resultant pulmonary hypertension and ultimate right heart failure issues.

## 2019-01-01 ENCOUNTER — ANESTHESIA (OUTPATIENT)
Dept: ENDOSCOPY | Age: 74
End: 2019-01-01
Payer: MEDICARE

## 2019-01-01 ENCOUNTER — TELEPHONE (OUTPATIENT)
Dept: CARDIOLOGY CLINIC | Age: 74
End: 2019-01-01

## 2019-01-01 ENCOUNTER — HOSPITAL ENCOUNTER (OUTPATIENT)
Dept: LAB | Age: 74
Discharge: HOME | End: 2019-02-15
Payer: MEDICARE

## 2019-01-01 ENCOUNTER — HOSPITAL ENCOUNTER (OUTPATIENT)
Age: 74
Setting detail: OUTPATIENT SURGERY
Discharge: HOME | End: 2019-02-13
Attending: INTERNAL MEDICINE | Admitting: INTERNAL MEDICINE
Payer: MEDICARE

## 2019-01-01 ENCOUNTER — ANESTHESIA EVENT (OUTPATIENT)
Dept: ENDOSCOPY | Age: 74
End: 2019-01-01
Payer: MEDICARE

## 2019-01-01 VITALS
HEIGHT: 66 IN | SYSTOLIC BLOOD PRESSURE: 107 MMHG | RESPIRATION RATE: 13 BRPM | WEIGHT: 135.38 LBS | OXYGEN SATURATION: 95 % | TEMPERATURE: 97 F | BODY MASS INDEX: 21.76 KG/M2 | HEART RATE: 75 BPM | DIASTOLIC BLOOD PRESSURE: 62 MMHG

## 2019-01-01 LAB
H PYLORI AG STL QL IA: NEGATIVE
SPECIMEN SOURCE: NORMAL

## 2019-01-01 PROCEDURE — 74011250636 HC RX REV CODE- 250/636

## 2019-01-01 PROCEDURE — 36415 COLL VENOUS BLD VENIPUNCTURE: CPT

## 2019-01-01 PROCEDURE — 74011250636 HC RX REV CODE- 250/636: Performed by: NURSE ANESTHETIST, CERTIFIED REGISTERED

## 2019-01-01 PROCEDURE — 76060000031 HC ANESTHESIA FIRST 0.5 HR: Performed by: INTERNAL MEDICINE

## 2019-01-01 PROCEDURE — 87338 HPYLORI STOOL AG IA: CPT

## 2019-01-01 PROCEDURE — 77030008565 HC TBNG SUC IRR ERBE -B: Performed by: INTERNAL MEDICINE

## 2019-01-01 PROCEDURE — 74011000250 HC RX REV CODE- 250: Performed by: NURSE ANESTHETIST, CERTIFIED REGISTERED

## 2019-01-01 PROCEDURE — 76040000019: Performed by: INTERNAL MEDICINE

## 2019-01-01 PROCEDURE — 77030018846 HC SOL IRR STRL H20 ICUM -A: Performed by: INTERNAL MEDICINE

## 2019-01-01 RX ORDER — PROPOFOL 10 MG/ML
INJECTION, EMULSION INTRAVENOUS AS NEEDED
Status: DISCONTINUED | OUTPATIENT
Start: 2019-01-01 | End: 2019-01-01 | Stop reason: HOSPADM

## 2019-01-01 RX ORDER — SODIUM CHLORIDE 0.9 % (FLUSH) 0.9 %
5-40 SYRINGE (ML) INJECTION EVERY 8 HOURS
Status: DISCONTINUED | OUTPATIENT
Start: 2019-01-01 | End: 2019-01-01 | Stop reason: HOSPADM

## 2019-01-01 RX ORDER — SODIUM CHLORIDE, SODIUM LACTATE, POTASSIUM CHLORIDE, CALCIUM CHLORIDE 600; 310; 30; 20 MG/100ML; MG/100ML; MG/100ML; MG/100ML
75 INJECTION, SOLUTION INTRAVENOUS CONTINUOUS
Status: CANCELLED | OUTPATIENT
Start: 2019-01-01

## 2019-01-01 RX ORDER — SODIUM CHLORIDE 0.9 % (FLUSH) 0.9 %
5-40 SYRINGE (ML) INJECTION AS NEEDED
Status: DISCONTINUED | OUTPATIENT
Start: 2019-01-01 | End: 2019-01-01 | Stop reason: HOSPADM

## 2019-01-01 RX ORDER — SODIUM CHLORIDE 0.9 % (FLUSH) 0.9 %
5-40 SYRINGE (ML) INJECTION EVERY 8 HOURS
Status: CANCELLED | OUTPATIENT
Start: 2019-01-01

## 2019-01-01 RX ORDER — SODIUM CHLORIDE, SODIUM LACTATE, POTASSIUM CHLORIDE, CALCIUM CHLORIDE 600; 310; 30; 20 MG/100ML; MG/100ML; MG/100ML; MG/100ML
75 INJECTION, SOLUTION INTRAVENOUS CONTINUOUS
Status: DISCONTINUED | OUTPATIENT
Start: 2019-01-01 | End: 2019-01-01 | Stop reason: HOSPADM

## 2019-01-01 RX ORDER — ONDANSETRON 2 MG/ML
4 INJECTION INTRAMUSCULAR; INTRAVENOUS ONCE
Status: CANCELLED | OUTPATIENT
Start: 2019-01-01 | End: 2019-01-01

## 2019-01-01 RX ORDER — SODIUM CHLORIDE 0.9 % (FLUSH) 0.9 %
5-40 SYRINGE (ML) INJECTION AS NEEDED
Status: CANCELLED | OUTPATIENT
Start: 2019-01-01

## 2019-01-01 RX ORDER — LIDOCAINE HYDROCHLORIDE 20 MG/ML
INJECTION, SOLUTION EPIDURAL; INFILTRATION; INTRACAUDAL; PERINEURAL AS NEEDED
Status: DISCONTINUED | OUTPATIENT
Start: 2019-01-01 | End: 2019-01-01 | Stop reason: HOSPADM

## 2019-01-01 RX ORDER — DILTIAZEM HYDROCHLORIDE 120 MG/1
CAPSULE, COATED, EXTENDED RELEASE ORAL DAILY
COMMUNITY

## 2019-01-01 RX ADMIN — SODIUM CHLORIDE, SODIUM LACTATE, POTASSIUM CHLORIDE, AND CALCIUM CHLORIDE 75 ML/HR: 600; 310; 30; 20 INJECTION, SOLUTION INTRAVENOUS at 09:46

## 2019-01-01 RX ADMIN — PROPOFOL 30 MG: 10 INJECTION, EMULSION INTRAVENOUS at 12:08

## 2019-01-01 RX ADMIN — LIDOCAINE HYDROCHLORIDE 20 MG: 20 INJECTION, SOLUTION EPIDURAL; INFILTRATION; INTRACAUDAL; PERINEURAL at 12:08

## 2019-01-01 RX ADMIN — FAMOTIDINE: 10 INJECTION, SOLUTION INTRAVENOUS at 09:47

## 2019-02-13 NOTE — ANESTHESIA PREPROCEDURE EVALUATION
Anesthetic History No history of anesthetic complications Review of Systems / Medical History Patient summary reviewed and pertinent labs reviewed Pulmonary COPD Sleep apnea Comments: pulm fibrosis ON o2 N/C Neuro/Psych Within defined limits Cardiovascular Hypertension Dysrhythmias : atrial fibrillation CAD, cardiac stents and CABG Exercise tolerance: >4 METS Comments: 2017 ECHO: EF= 50-55%, grade I ddx 2017 stress test: neg WMA  
GI/Hepatic/Renal 
Within defined limits Endo/Other Hypothyroidism Other Findings Physical Exam 
 
Airway Mallampati: II 
TM Distance: 4 - 6 cm Neck ROM: normal range of motion Mouth opening: Normal 
 
 Cardiovascular Regular rate and rhythm,  S1 and S2 normal,  no murmur, click, rub, or gallop Rhythm: regular Rate: normal 
 
 
 
 Dental 
 
Dentition: Lower dentition intact and Upper dentition intact Pulmonary Breath sounds clear to auscultation Abdominal 
GI exam deferred Other Findings Anesthetic Plan ASA: 3 Anesthesia type: MAC Induction: Intravenous Anesthetic plan and risks discussed with: Patient

## 2019-02-13 NOTE — H&P
[facility], [email or web address]. Tech urSelf Impression: 1. GERD Anorexia Unexplained wt loss Plan: 1. egd Chief Complaint:  
Altered taste HPI: 
Aaliyah Ling is a 66 y.o. male who is being seen on consult for the above. Has SEVERE sense of foul taste in mouth that prevents him from eating. No true dysphagia. Has lost 30lb because he can only have Boost. . PMH:  
Past Medical History:  
Diagnosis Date  Arrhythmia  Bronchitis 6/2013   CABG x 4 2001  CAD (coronary artery disease)  Cardiac cath 12/06/2012 RCA prev stent patent. LM severe but patent (supplies pLAD & CX). dLAD 60-85% (2.25 x 30-mm Resolute stent, resid 0%). SVG to % (known). SVG to dLAD 100% (known). Seq SVG to pLAD & RI patent. LVEDP 18.  EF 60%. Poss mild mid anterior hypk.  Cardiac nuclear imaging test, low risk 01/16/2017 Low risk. Sm basal inferior fixed defect, likely artifact. No ischemia. No WMA. EF 65%. Neg EKG on pharm stress test.  Unchanged from study of 1/28/15.  Chest pain   
 atypical  
 Chronic lung disease  Chronic obstructive pulmonary disease ([address]  Coronary artery disease Status post CABG x 4, with stenting of the native RCA in Dec 2001/ EF 60%  HAYWARD (dyspnea on exertion)  Elevated liver enzymes   
 mild  Hypercholesterolemia  Hypertension  Palpitations  Pulmonary fibrosis ([address]  PVC's (premature ventricular contractions)  Unspecified sleep apnea   
 wife stated pt's doctor aware  is unable to jazmin use of cpap  Vertigo   
 mild PSH:  
Past Surgical History:  
Procedure Laterality Date  HAND/FINGER SURGERY UNLISTED  11-16-11  
 right  HX ANGIOPLASTY  12/2001, 2012  
 with stenting of native RCA  HX CHOLECYSTECTOMY  2010  HX CORONARY ARTERY BYPASS GRAFT  6/1996  
 x 4; Single SVG to distal LAD; sequential SVG to proximal LAD & intermediate marginal branch; single SVG to distal RCA  HX HEENT    
 CATARACT  HX HERNIA REPAIR  2007 Social HX:  
Social History Socioeconomic History  Marital status:  Spouse name: Not on file  Number of children: Not on file  Years of education: Not on file  Highest education level: Not on file Social Needs  Financial resource strain: Not on file  Food insecurity - worry: Not on file  Food insecurity - inability: Not on file  Transportation needs - medical: Not on file  Transportation needs - non-medical: Not on file Occupational History  Not on file Tobacco Use  Smoking status: Never Smoker  Smokeless tobacco: Never Used Substance and Sexual Activity  Alcohol use: Yes Alcohol/week: 8.4 oz Types: 14 Shots of liquor per week Comment: none for 6 months  Drug use: No  
 Sexual activity: Not on file Other Topics Concern  Not on file Social History Narrative  Not on file FHX:  
Family History Problem Relation Age of Onset  Hypertension Brother  Arthritis-osteo Brother  Cancer Mother   
     bone and breast cancer Allergy: Allergies Allergen Reactions  Levofloxacin Swelling  Doxycycline Rash Not allergic to  Lipitor [Atorvastatin] Other (comments) Joint pain  
 
 
Home Medications:  
 
Medications Prior to Admission Medication Sig  
 dilTIAZem CD (CARTIA XT) 120 mg ER capsule Take  by mouth daily.  ELIQUIS 5 mg tablet TAKE ONE TABLET BY MOUTH TWICE DAILY  albuterol (PROVENTIL HFA, VENTOLIN HFA, PROAIR HFA) 90 mcg/actuation inhaler Take 2 Puffs by inhalation every four (4) hours as needed for Wheezing or Shortness of Breath. Ventolin HFA  metoprolol tartrate (LOPRESSOR) 50 mg tablet Take 1 Tab by mouth two (2) times a day. (Patient taking differently: Take 50 mg by mouth daily.)  levothyroxine (SYNTHROID) 50 mcg tablet Take 75 mcg by mouth daily.  LORazepam (ATIVAN) 1 mg tablet Take  by mouth every six (6) hours as needed.  simvastatin (ZOCOR) 80 mg tablet Take 80 mg by mouth nightly. Indications: Patient is taking half a tab  nitroglycerin (NITROSTAT) 0.4 mg SL tablet 1 Tab by SubLINGual route every five (5) minutes as needed for Chest Pain.  omeprazole (PRILOSEC OTC) 20 mg tablet Take 20 mg by mouth as needed.  cetirizine (ZYRTEC) 10 mg tablet Take  by mouth as needed.  zolpidem (AMBIEN) 10 mg tablet Take 12.5 mg by mouth nightly.  NYSTATIN PO Take  by mouth three (3) times daily.  montelukast (SINGULAIR) 10 mg tablet Take 10 mg by mouth daily.  benzonatate (TESSALON) 200 mg capsule Take 200 mg by mouth three (3) times daily as needed for Cough. Review of Systems:  
 
Constitutional: Wt loss Skin: No rashes, pruritis, jaundice, ulcerations, erythema. HENT: No headaches, nosebleeds, sinus pressure, rhinorrhea, sore throat. Eyes: No visual changes, blurred vision, eye pain, photophobia, jaundice. Cardiovascular: No chest pain, heart palpitations. Respiratory: No cough, SOB, wheezing, chest discomfort, orthopnea. Gastrointestinal: Altered taste, Genitourinary: No dysuria, bleeding, discharge, pyuria. Musculoskeletal: No weakness, arthralgias, wasting. Endo: No sweats. Heme: No bruising, easy bleeding. Allergies: As noted. Neurological: Cranial nerves intact. Alert and oriented. Gait not assessed. Psychiatric:  No anxiety, depression, hallucinations. Visit Vitals /61 Pulse 73 Temp 97.5 °F (36.4 °C) Resp 18 Ht 5' 6\" (1.676 m) Wt 61.4 kg (135 lb 6 oz) SpO2 100% BMI 21.85 kg/m² Physical Assessment:  
 
constitutional: appearance: well developed, well nourished, normal habitus, no deformities, in no acute distress. skin: inspection: no rashes, ulcers, icterus or other lesions; no clubbing or telangiectasias. palpation: no induration or subcutaneos nodules. eyes: inspection: normal conjunctivae and lids; no jaundice pupils: symmetrical, normoreactive to light, normal accommodation and size. ENMT: mouth: normal oral mucosa,lips and gums; good dentition. oropharynx: normal tongue, hard and soft palate; posterior pharynx without erithema, exudate or lesions. neck: thyroid: normal size, consistency and position; no masses or tenderness. respiratory: effort: normal chest excursion; no intercostal retraction or accessory muscle use. cardiovascular: abdominal aorta: normal size and position; no bruits. palpation: PMI of normal size and position; normal rhythm; no thrill or murmurs. abdominal: abdomen: normal consistency; no tenderness or masses. hernias: no hernias appreciated. liver: normal size and consistency. spleen: not palpable. rectal: hemoccult/guaiac: not performed. musculoskeletal: digits and nails: no clubbing, cyanosis, petechiae or other inflammatory conditions. gait: normal gait and station head and neck: normal range of motion; no pain, crepitation or contracture. spine/ribs/pelvis: normal range of motion; no pain, deformity or contracture. lymphatic: axilae: not palpable. groin: not palpable. neck: within normal limits. other: not palpable. neurologic: cranial nerves: II-XII normal.  
psychiatric: judgement/insight: within normal limits. memory: within normal limits for recent and remote events. mood and affect: no evidence of depression, anxiety or agitation. orientation: oriented to time, space and person. Basic Metabolic Profile No results for input(s): NA, K, CL, CO2, BUN, GLU, CA, MG, PHOS in the last 72 hours. No lab exists for component: CREAT  
  
CBC w/Diff No results for input(s): WBC, RBC, HGB, HCT, MCV, MCH, MCHC, RDW, PLT, HGBEXT, HCTEXT, PLTEXT in the last 72 hours. No lab exists for component: MPV No results for input(s): GRANS, LYMPH, EOS, PRO, MYELO, METAS, BLAST in the last 72 hours. No lab exists for component: MONO, BASO Hepatic Function No results for input(s): ALB, TP, TBILI, GPT, SGOT, AP, AML, LPSE in the last 72 hours. No lab exists for component: JEANNIE De Jesus MD, M.D. [facility], [facility] 
[email or web address]. [facility]

## 2019-02-13 NOTE — ANESTHESIA POSTPROCEDURE EVALUATION
Procedure(s): UPPER ENDOSCOPY. Anesthesia Post Evaluation Multimodal analgesia: multimodal analgesia used between 6 hours prior to anesthesia start to PACU discharge Patient location during evaluation: bedside Patient participation: complete - patient participated Level of consciousness: awake Pain management: adequate Airway patency: patent Anesthetic complications: no 
Cardiovascular status: stable Respiratory status: acceptable Hydration status: acceptable Post anesthesia nausea and vomiting:  controlled Visit Vitals /62 (BP Patient Position: At rest) Pulse 75 Temp 36.1 °C (97 °F) Resp 13 Ht 5' 6\" (1.676 m) Wt 61.4 kg (135 lb 6 oz) SpO2 95% BMI 21.85 kg/m²

## 2019-02-13 NOTE — DISCHARGE INSTRUCTIONS
Upper GI Endoscopy: What to Expect at [address] [place] [address]  After you have an endoscopy, you will stay at the hospital or clinic for 1 to 2 hours. This will allow the medicine to wear off. You will be able to go home after your doctor or nurse checks to make sure you are not having any problems. You may have to stay overnight if you had treatment during the test. You may have a sore throat for a day or two after the test.  This care sheet gives you a general idea about what to expect after the test.  How can you care for yourself at home? Activity  · Rest as much as you need to after you go home. · You should be able to go back to your usual activities the day after the test.  Diet  · Follow your doctor's directions for eating after the test.  · Drink plenty of fluids (unless your doctor has told you not to). Medications  · If you have a sore throat the day after the test, use an over-the-counter spray to numb your throat. Follow-up care is a key part of your treatment and safety. Be sure to make and go to all appointments, and call your doctor if you are having problems. It's also a good idea to know your test results and keep a list of the medicines you take. When should you call for help? Call 911 anytime you think you may need emergency care. For example, call if:    · You passed out (loses consciousness).     · You have trouble breathing.     · You pass maroon or bloody stools.    Call your doctor now or seek immediate medical care if:    · You have pain that does not get better after your take pain medicine.     · You have new or worse belly pain.     · You have blood in your stools.     · You are sick to your stomach and cannot keep fluids down.     · You have a fever.     · You cannot pass stools or gas.    Watch closely for changes in your health, and be sure to contact your doctor if:    · Your throat still hurts after a day or two.     · You do not get better as expected.    Where can you learn more?  Go to http://lena-may.info/. Enter [phone] in the search box to learn more about \"Upper GI Endoscopy: What to Expect at Home. \"  Current as of: March 27, 2018  Content Version: 11.9  © 5915-2532 [employer]. Care instructions adapted under license by [employer] (which disclaims liability or warranty for this information). If you have questions about a medical condition or this instruction, always ask your healthcare professional. [address] any warranty or liability for your use of this information. H. Pylori: About This Test  What is it? H. pylori tests are used to check for a Helicobacter pylori bacteria infection in the stomach and upper part of the small intestine. H. pylori can cause peptic ulcers. But most people with this type of bacteria in their digestive systems do not get ulcers. Different tests may be used to check for an H. pylori infection. · Blood antibody test. This checks to see if your body has made antibodies to fight H. pylori bacteria. · Urea breath test. It tests your breath to see if you have H. pylori bacteria in your stomach. · Stool antigen test. This test looks for substances in your feces (stool) that trigger the immune system to fight an H. pylori infection. (These substances are called H. pylori antigens.)  · Stomach biopsy. A small sample (biopsy) is taken from the lining of your stomach and small intestine. The samples are checked for H. pylori. Why is this test done? H. pylori tests are done to:  · Find out if an infection with H. pylori bacteria may be causing an ulcer or irritation of the stomach lining (gastritis). · Find out if treatment for the infection has been successful.   How can you prepare for the test?  Blood antibody test  · You do not need to do anything before you have this test.  Urea breath test, stool antigen test, or stomach biopsy  · Medicines you take may change the results of these tests. Be sure to tell your doctor about all the prescription and over-the-counter medicines you take. Your doctor may advise you to stop taking some of your medicines. Urea breath test or stomach biopsy  · You will be asked to not eat or drink anything for a certain amount of time before your breath test or stomach biopsy. Follow your doctor's instructions about how long you need to avoid eating and drinking before the test. If you are going to have a stomach biopsy, your doctor will give you instructions on how to prepare. What happens during the test?  Blood antibody test  · A health professional takes a sample of your blood. Urea breath test  A breath sample is collected when you blow into a balloon or blow bubbles into a bottle of liquid. The health professional will:  · Collect a sample of your breath before the test starts. · Give you a capsule or some water to swallow that contains tagged or radioactive material.  · Collect more samples of your breath. The samples will be tested to see if they contain material formed when H. pylori comes into contact with the tagged or radioactive material.  Stool antigen test  For this test, you may be asked to collect the stool sample at home. To collect the sample, you need to:  · Pass stool into a dry container. Either solid or liquid stools can be collected. Be careful not to get urine or toilet tissue in with the stool sample. · Replace the container cap. Label the container with your name, your doctor's name, and the date the sample was collected. · Wash your hands well after you collect the sample. · Take the sealed container to your doctor's office or to the lab as soon as you can. Stomach biopsy  · A procedure called endoscopy is used to collect samples of tissue from the stomach and the first part of the small intestine. The tissue samples are tested in a lab to see if they contain H. pylori.   Follow-up care is a key part of your treatment and safety. Be sure to make and go to all appointments, and call your doctor if you are having problems. It's also a good idea to keep a list of the medicines you take. Ask your doctor when you can expect to have your test results. Where can you learn more? Go to http://lena-may.info/. Enter O939 in the search box to learn more about \"H. Pylori: About This Test.\"  Current as of: June 25, 2018  Content Version: 11.9  © 5092-5363 [employer]. Care instructions adapted under license by [employer] (which disclaims liability or warranty for this information). If you have questions about a medical condition or this instruction, always ask your healthcare professional. [address] any warranty or liability for your use of this information. DISCHARGE SUMMARY from Nurse    PATIENT INSTRUCTIONS:    After general anesthesia or intravenous sedation, for 24 hours or while taking prescription Narcotics:  · Limit your activities  · Do not drive and operate hazardous machinery  · Do not make important personal or business decisions  · Do  not drink alcoholic beverages  · If you have not urinated within 8 hours after discharge, please contact your surgeon on call. Report the following to your surgeon:  · Excessive pain, swelling, redness or odor of or around the surgical area  · Temperature over 100.5  · Nausea and vomiting lasting longer than 4 hours or if unable to take medications  · Any signs of decreased circulation or nerve impairment to extremity: change in color, persistent  numbness, tingling, coldness or increase pain  · Any questions      *  Please give a list of your current medications to your Primary Care Provider. *  Please update this list whenever your medications are discontinued, doses are      changed, or new medications (including over-the-counter products) are added.     *  Please carry medication information at all times in case of emergency situations. These are general instructions for a healthy lifestyle:    No smoking/ No tobacco products/ Avoid exposure to second hand smoke  Surgeon General's Warning:  Quitting smoking now greatly reduces serious risk to your health. Obesity, smoking, and sedentary lifestyle greatly increases your risk for illness    A healthy diet, regular physical exercise & weight monitoring are important for maintaining a healthy lifestyle    You may be retaining fluid if you have a history of heart failure or if you experience any of the following symptoms:  Weight gain of 3 pounds or more overnight or 5 pounds in a week, increased swelling in our hands or feet or shortness of breath while lying flat in bed. Please call your doctor as soon as you notice any of these symptoms; do not wait until your next office visit. Recognize signs and symptoms of STROKE:    F-face looks uneven    A-arms unable to move or move unevenly    S-speech slurred or non-existent    T-time-call 911 as soon as signs and symptoms begin-DO NOT go       Back to bed or wait to see if you get better-TIME IS BRAIN. Warning Signs of HEART ATTACK     Call 911 if you have these symptoms:   Chest discomfort. Most heart attacks involve discomfort in the center of the chest that lasts more than a few minutes, or that goes away and comes back. It can feel like uncomfortable pressure, squeezing, fullness, or pain.  Discomfort in other areas of the upper body. Symptoms can include pain or discomfort in one or both arms, the back, neck, jaw, or stomach.  Shortness of breath with or without chest discomfort.  Other signs may include breaking out in a cold sweat, nausea, or lightheadedness. Don't wait more than five minutes to call 911 - MINUTES MATTER! Fast action can save your life. Calling 911 is almost always the fastest way to get lifesaving treatment.  Emergency Medical Services staff can begin treatment when they arrive -- up to an hour sooner than if someone gets to the hospital by car. The discharge information has been reviewed with the patient and spouse. The patient and spouse verbalized understanding. Discharge medications reviewed with the patient and spouse and appropriate educational materials and side effects teaching were provided.   ___________________________________________________________________________________________________________________________________

## 2019-02-21 NOTE — TELEPHONE ENCOUNTER
[facility] sent Dr. Dickson Montano there endoscopy report on patient and also requested to hold lopressor for 2 weeks d/t some altered taste. Verbal order and read back per Marena Bumpers, MD 
Permission to hold lopressor denied. [facility] and patient made aware.
